# Patient Record
Sex: FEMALE | Race: WHITE | Employment: OTHER | URBAN - METROPOLITAN AREA
[De-identification: names, ages, dates, MRNs, and addresses within clinical notes are randomized per-mention and may not be internally consistent; named-entity substitution may affect disease eponyms.]

---

## 2017-12-06 DIAGNOSIS — R25.1 TREMOR: ICD-10-CM

## 2017-12-06 RX ORDER — PROPRANOLOL HYDROCHLORIDE 80 MG/1
80 CAPSULE, EXTENDED RELEASE ORAL DAILY
Qty: 90 CAPSULE | Refills: 3 | Status: SHIPPED | OUTPATIENT
Start: 2017-12-06 | End: 2018-01-02

## 2018-01-02 ENCOUNTER — OFFICE VISIT (OUTPATIENT)
Dept: NEUROLOGY | Facility: CLINIC | Age: 78
End: 2018-01-02
Payer: MEDICARE

## 2018-01-02 VITALS
WEIGHT: 176.6 LBS | TEMPERATURE: 97.4 F | BODY MASS INDEX: 30.31 KG/M2 | DIASTOLIC BLOOD PRESSURE: 79 MMHG | HEART RATE: 66 BPM | OXYGEN SATURATION: 97 % | SYSTOLIC BLOOD PRESSURE: 134 MMHG

## 2018-01-02 DIAGNOSIS — R25.1 TREMOR: ICD-10-CM

## 2018-01-02 PROCEDURE — 99212 OFFICE O/P EST SF 10 MIN: CPT | Performed by: PSYCHIATRY & NEUROLOGY

## 2018-01-02 RX ORDER — PROPRANOLOL HYDROCHLORIDE 80 MG/1
80 CAPSULE, EXTENDED RELEASE ORAL DAILY
Qty: 90 CAPSULE | Refills: 3 | Status: SHIPPED | OUTPATIENT
Start: 2018-01-02 | End: 2018-12-05

## 2018-01-02 NOTE — NURSING NOTE
"Hoda Rebolledo's goals for this visit include: Tremors- doing ok   She requests these members of her care team be copied on today's visit information: no    PCP: Binu Sultana    Referring Provider:  Binu Brady MD  [address] [place] [address]    Chief Complaint   Patient presents with     Tremors       Initial /79 (BP Location: Right arm, Patient Position: Chair, Cuff Size: Adult Regular)  Pulse 66  Temp 97.4  F (36.3  C) (Oral)  Wt 80.1 kg (176 lb 9.6 oz)  SpO2 97%  BMI 30.31 kg/m2 Estimated body mass index is 30.31 kg/(m^2) as calculated from the following:    Height as of 7/23/13: 1.626 m (5' 4\").    Weight as of this encounter: 80.1 kg (176 lb 9.6 oz).  Medication Reconciliation: complete    Do you need any medication refills at today's visit? no    "

## 2018-01-02 NOTE — MR AVS SNAPSHOT
After Visit Summary   1/2/2018    Hoda Rebolledo    MRN: [id number]           Patient Information     Date Of Birth          1940        Visit Information        Provider Department      1/2/2018 10:30 AM Binu Brady MD [facility]        Today's Diagnoses     Tremor           Follow-ups after your visit        Follow-up notes from your care team     Return in about 1 year (around 1/2/2019).      Your next 10 appointments already scheduled     Dec 05, 2018 10:30 AM CST   Return Visit with Binu Brady MD   [facility] ([facility]    [address] [place] [address] [phone]              Who to contact     If you have questions or need follow up information about today's clinic visit or your schedule please contact [facility] directly at [phone].  Normal or non-critical lab and imaging results will be communicated to you by [employer] Microsystemshart, letter or phone within 4 business days after the clinic has received the results. If you do not hear from us within 7 days, please contact the clinic through [employer] Microsystem[employer]art or phone. If you have a critical or abnormal lab result, we will notify you by phone as soon as possible.  Submit refill requests through Peeky or call your pharmacy and they will forward the refill request to us. Please allow 3 business days for your refill to be completed.          Additional Information About Your Visit        MyChart Information     Peeky gives you secure access to your electronic health record. If you see a primary care provider, you can also send messages to your care team and make appointments. If you have questions, please call your primary care clinic.  If you do not have a primary care provider, please call [phone] and they will assist you.      Peeky is an electronic gateway that provides easy, online access to your medical records. With Peeky, you can  request a clinic appointment, read your test results, renew a prescription or communicate with your care team.     To access your existing account, please contact your [facility] or call [phone] for assistance.        Care EveryWhere ID     This is your Care EveryWhere ID. This could be used by other organizations to access your [place] medical records  [id number]        Your Vitals Were     Pulse Temperature Pulse Oximetry BMI (Body Mass Index)          66 97.4  F (36.3  C) (Oral) 97% 30.31 kg/m2         Blood Pressure from Last 3 Encounters:   01/02/18 134/79   11/28/16 136/80   08/15/16 134/76    Weight from Last 3 Encounters:   01/02/18 80.1 kg (176 lb 9.6 oz)   03/31/14 76.4 kg (168 lb 6.4 oz)   07/23/13 80.7 kg (178 lb)              Today, you had the following     No orders found for display         Where to get your medicines      These medications were sent to [id number] - [place], [place] - [address] [address]     Phone:  [phone]     propranolol 80 MG 24 hr capsule          Primary Care Provider Office Phone # Fax #    Binu Sultana [phone]       [facility] [address] [place] [address]        Equal Access to Services     SHANKAR STEWART AH: Hadii nia ku hadasho Soomaali, waaxda luqadaha, qaybta kaalmada tommie, ivan shoemaker. So [facility] [phone].    ATENCIÓN: Si habla español, tiene a jack disposición servicios gratuitos de asistencia lingüística. Licha al [phone].    We comply with applicable federal civil rights laws and [place] laws. We do not discriminate on the basis of race, color, national origin, age, disability, sex, sexual orientation, or gender identity.            Thank you!     Thank you for choosing [facility]  for your care. Our goal is always to provide you with excellent care. Hearing back  from our patients is one way we can continue to improve our services. Please take a few minutes to complete the written survey that you may receive in the mail after your visit with us. Thank you!             Your Updated Medication List - Protect others around you: Learn how to safely use, store and throw away your medicines at www.disposemymeds.org.          This list is accurate as of: 1/2/18 10:44 AM.  Always use your most recent med list.                   Brand Name Dispense Instructions for use Diagnosis    METFORMIN HCL      1 tablet 2 times daily.        propranolol 80 MG 24 hr capsule    INDERAL LA    90 capsule    Take 1 capsule (80 mg) by mouth daily    Tremor       simvastatin 20 MG tablet    ZOCOR     Take 1 tablet by mouth At Bedtime        vitamin D 2000 UNITS Caps      Take 1 tablet by mouth daily.

## 2018-01-02 NOTE — LETTER
2018         RE: Hoda Phipps  [address]    [place] [address]        Dear Colleague,    Thank you for referring your patient, Hoda Phipps, to the [facility]. Please see a copy of my visit note below.    HISTORY OF PRESENT ILLNESS:  Hoda Phipps is seen in followup with essential tremor.  I last saw her in 2016.  She is here with her .  There has been no major change in her neurologic status.  She is on propranolol long-acting 80 mg per day.  This works as well as anything.  She had been on primidone, but had side effects.  She notices the tremor most prominently when she is trying to drink from a small cup.  Otherwise, she adapts her activities to the tremor.  It is not disabling and not getting worse.      PHYSICAL EXAMINATION:  Her blood pressure is 134/79.  Reproduction of a spiral has an obvious tremulous quality.  There is nothing to add otherwise.      ASSESSMENT:  Essential tremor.      DISCUSSION:  This patient is seen in followup with essential tremor.  She is doing well on her current regimen of propranolol long-acting 80 mg.  I gave her a 1-year refill.  I will see her in followup at that time or sooner if there are issues.         ILSA KENT MD             D: 2018 10:44   T: 2018 10:55   MT: [id number]      Name:     HODA PHIPPS   MRN:      [id number]        Account:      [id number]   :      1940           Visit Date:   2018      Document: [id number]       Again, thank you for allowing me to participate in the care of your patient.        Sincerely,        Ilsa Kent MD

## 2018-01-02 NOTE — PROGRESS NOTES
HISTORY OF PRESENT ILLNESS:  Hoda Phipps is seen in followup with essential tremor.  I last saw her in 2016.  She is here with her .  There has been no major change in her neurologic status.  She is on propranolol long-acting 80 mg per day.  This works as well as anything.  She had been on primidone, but had side effects.  She notices the tremor most prominently when she is trying to drink from a small cup.  Otherwise, she adapts her activities to the tremor.  It is not disabling and not getting worse.      PHYSICAL EXAMINATION:  Her blood pressure is 134/79.  Reproduction of a spiral has an obvious tremulous quality.  There is nothing to add otherwise.      ASSESSMENT:  Essential tremor.      DISCUSSION:  This patient is seen in followup with essential tremor.  She is doing well on her current regimen of propranolol long-acting 80 mg.  I gave her a 1-year refill.  I will see her in followup at that time or sooner if there are issues.         ILSA KENT MD             D: 2018 10:44   T: 2018 10:55   MT: [id number]      Name:     HODA PHIPPS   MRN:      [id number]        Account:      [id number]   :      1940           Visit Date:   2018      Document: [id number]

## 2018-12-05 ENCOUNTER — OFFICE VISIT (OUTPATIENT)
Dept: NEUROLOGY | Facility: CLINIC | Age: 78
End: 2018-12-05
Payer: MEDICARE

## 2018-12-05 VITALS
DIASTOLIC BLOOD PRESSURE: 82 MMHG | OXYGEN SATURATION: 98 % | WEIGHT: 171 LBS | BODY MASS INDEX: 29.35 KG/M2 | HEART RATE: 68 BPM | SYSTOLIC BLOOD PRESSURE: 144 MMHG

## 2018-12-05 DIAGNOSIS — G25.0 ESSENTIAL TREMOR: Primary | ICD-10-CM

## 2018-12-05 DIAGNOSIS — R25.1 TREMOR: ICD-10-CM

## 2018-12-05 PROCEDURE — 99213 OFFICE O/P EST LOW 20 MIN: CPT | Performed by: PSYCHIATRY & NEUROLOGY

## 2018-12-05 RX ORDER — PROPRANOLOL HYDROCHLORIDE 80 MG/1
80 CAPSULE, EXTENDED RELEASE ORAL DAILY
Qty: 90 CAPSULE | Refills: 3 | Status: SHIPPED | OUTPATIENT
Start: 2018-12-05 | End: 2019-12-04

## 2018-12-05 RX ORDER — LORAZEPAM 0.5 MG/1
0.5 TABLET ORAL EVERY 8 HOURS PRN
Qty: 10 TABLET | Refills: 0 | Status: SHIPPED | OUTPATIENT
Start: 2018-12-05 | End: 2019-07-01

## 2018-12-05 NOTE — LETTER
12/5/2018         RE: Hoda Phipps  [address]  [address]  [place] [address]        Dear Colleague,    Thank you for referring your patient, Hoda Phipps, to the [facility]. Please see a copy of my visit note below.    Visit Date:   12/05/2018      NEUROLOGY CONSULTATION       PATIENT OF:  Ilsa Sultana MD, [facility]       INTERVAL HISTORY:  This patient is seen in followup with essential tremor.  She is here with her .  I last saw her almost a year ago.  With regard to the tremor, things are going fairly well.  She is on propranolol long-acting 80 mg daily.  She did have worsening tremor this summer.  She was playing pickleball and fell and broke her upper right arm.  This made her extremely anxious and depressed so the tremor was worse.  Then, she had to have gallbladder surgery while still in a cast.  This was an additional stress to her.  She now is back to her baseline.  Her only real complaint is that when she eats in public she has worsening tremor.      PHYSICAL EXAMINATION:   GENERAL:  The patient is cooperative and in no distress.   VITAL SIGNS:  Her blood pressure is 144/82.     NEUROLOGIC:  Her reproduction of a spiral shows obvious tremor but it is legible.   She has tremor with her hands held out in front of her.      ASSESSMENT:  Essential tremor.      DISCUSSION:  This patient is seen in followup with essential tremor.  I have given her a refill of propranolol 80 mg long-acting daily.  In addition, I have given her a prescription for lorazepam 0.5 mg to use on an as needed basis when she goes out in public.  Hopefully, this will help moderate the tremor, so it is not such a bother to her.  I did review the side effects including sedation.  She will try it.  Otherwise, I will see her in followup in 1 year.         ILSA KENT MD             D: 12/05/2018   T: 12/05/2018   MT: JEFFREY      Name:     HODA PHIPPS   MRN:      [id number]         Account:      [id number]   :      1940           Visit Date:   2018      Document: [id number]       Again, thank you for allowing me to participate in the care of your patient.        Sincerely,        Binu Brady MD

## 2018-12-05 NOTE — NURSING NOTE
Hoda Rebolledo's goals for this visit include:   Chief Complaint   Patient presents with     RECHECK     tremors getting worse      She requests these members of her care team be copied on today's visit information: PCP    PCP: Binu Sultana    Referring Provider:  Binu Brady MD  [address] [place] [address]    /82 (BP Location: Left arm, Patient Position: Sitting, Cuff Size: Adult Regular)  Pulse 68  Wt 77.6 kg (171 lb)  SpO2 98%  BMI 29.35 kg/m2    Do you need any medication refills at today's visit? N

## 2018-12-05 NOTE — MR AVS SNAPSHOT
After Visit Summary   12/5/2018    Hoda Rebolledo    MRN: [id number]           Patient Information     Date Of Birth          1940        Visit Information        Provider Department      12/5/2018 10:30 AM Binu Brady MD [facility]        Today's Diagnoses     Essential tremor    -  1    Tremor           Follow-ups after your visit        Follow-up notes from your care team     Return in about 1 year (around 12/5/2019).      Your next 10 appointments already scheduled     Dec 04, 2019 10:30 AM CST   Return Visit with Binu Brady MD   [facility] ([facility]    [address] [place] [address] [phone]              Who to contact     If you have questions or need follow up information about today's clinic visit or your schedule please contact [facility] directly at [phone].  Normal or non-critical lab and imaging results will be communicated to you by [employer]Conceptshart, letter or phone within 4 business days after the clinic has received the results. If you do not hear from us within 7 days, please contact the clinic through [employer]art or phone. If you have a critical or abnormal lab result, we will notify you by phone as soon as possible.  Submit refill requests through Narr8 or call your pharmacy and they will forward the refill request to us. Please allow 3 business days for your refill to be completed.          Additional Information About Your Visit        MyChart Information     [employer] gives you secure access to your electronic health record. If you see a primary care provider, you can also send messages to your care team and make appointments. If you have questions, please call your primary care clinic.  If you do not have a primary care provider, please call [phone] and they will assist you.      Narr8 is an electronic gateway that provides easy, online access to your medical  records. With "[employer] you can request a clinic appointment, read your test results, renew a prescription or communicate with your care team.     To access your existing account, please contact your [facility] or call [phone] for assistance.        Care EveryWhere ID     This is your Care EveryWhere ID. This could be used by other organizations to access your [place] medical records  [id number]        Your Vitals Were     Pulse Pulse Oximetry BMI (Body Mass Index)             68 98% 29.35 kg/m2          Blood Pressure from Last 3 Encounters:   12/05/18 144/82   01/02/18 134/79   11/28/16 136/80    Weight from Last 3 Encounters:   12/05/18 77.6 kg (171 lb)   01/02/18 80.1 kg (176 lb 9.6 oz)   03/31/14 76.4 kg (168 lb 6.4 oz)              Today, you had the following     No orders found for display         Today's Medication Changes          These changes are accurate as of 12/5/18 10:51 AM.  If you have any questions, ask your nurse or doctor.               Start taking these medicines.        Dose/Directions    LORazepam 0.5 MG tablet   Commonly known as:  ATIVAN   Used for:  Essential tremor   Started by:  Binu Brady MD        Dose:  0.5 mg   Take 1 tablet (0.5 mg) by mouth every 8 hours as needed for anxiety   Quantity:  10 tablet   Refills:  0            Where to get your medicines      These medications were sent to [id number] [address] [facility] [address]     Phone:  [phone]     propranolol ER 80 MG 24 hr capsule         Some of these will need a paper prescription and others can be bought over the counter.  Ask your nurse if you have questions.     Bring a paper prescription for each of these medications     LORazepam 0.5 MG tablet                Primary Care Provider Office Phone # Fax #    Binu Sultana [phone]       [facility] [address]  [place]  [place] [address]        Equal Access to Services     MORELIA STEWART : Hadii aad ku hadeffiekatalina Roslynali, wamichelleda luqadaha, qaybta kadakotaivan chadwickmadhujoslyn shoemaker. So [facility] [phone].    ATENCIÓN: Si habla español, tiene a jack disposición servicios gratuitos de asistencia lingüística. Elizabethame al [phone].    We comply with applicable federal civil rights laws and [place] laws. We do not discriminate on the basis of race, color, national origin, age, disability, sex, sexual orientation, or gender identity.            Thank you!     Thank you for choosing [facility]  for your care. Our goal is always to provide you with excellent care. Hearing back from our patients is one way we can continue to improve our services. Please take a few minutes to complete the written survey that you may receive in the mail after your visit with us. Thank you!             Your Updated Medication List - Protect others around you: Learn how to safely use, store and throw away your medicines at www.disposemymeds.org.          This list is accurate as of 12/5/18 10:51 AM.  Always use your most recent med list.                   Brand Name Dispense Instructions for use Diagnosis    LORazepam 0.5 MG tablet    ATIVAN    10 tablet    Take 1 tablet (0.5 mg) by mouth every 8 hours as needed for anxiety    Essential tremor       METFORMIN HCL      1 tablet 2 times daily.        propranolol ER 80 MG 24 hr capsule    INDERAL LA    90 capsule    Take 1 capsule (80 mg) by mouth daily    Tremor       simvastatin 20 MG tablet    ZOCOR     Take 1 tablet by mouth At Bedtime        vitamin D 2000 units Caps      Take 1 tablet by mouth daily.

## 2018-12-06 NOTE — PROGRESS NOTES
Visit Date:   2018      NEUROLOGY CONSULTATION       PATIENT OF:  Ilsa Sultana MD, [facility] HISTORY:  This patient is seen in followup with essential tremor.  She is here with her .  I last saw her almost a year ago.  With regard to the tremor, things are going fairly well.  She is on propranolol long-acting 80 mg daily.  She did have worsening tremor this summer.  She was playing pickleball and fell and broke her upper right arm.  This made her extremely anxious and depressed so the tremor was worse.  Then, she had to have gallbladder surgery while still in a cast.  This was an additional stress to her.  She now is back to her baseline.  Her only real complaint is that when she eats in public she has worsening tremor.      PHYSICAL EXAMINATION:   GENERAL:  The patient is cooperative and in no distress.   VITAL SIGNS:  Her blood pressure is 144/82.     NEUROLOGIC:  Her reproduction of a spiral shows obvious tremor but it is legible.   She has tremor with her hands held out in front of her.      ASSESSMENT:  Essential tremor.      DISCUSSION:  This patient is seen in followup with essential tremor.  I have given her a refill of propranolol 80 mg long-acting daily.  In addition, I have given her a prescription for lorazepam 0.5 mg to use on an as needed basis when she goes out in public.  Hopefully, this will help moderate the tremor, so it is not such a bother to her.  I did review the side effects including sedation.  She will try it.  Otherwise, I will see her in followup in 1 year.         ILSA KENT MD             D: 2018   T: 2018   MT: JEFFREY      Name:     ABUNDIO PHIPPS   MRN:      [id number]        Account:      [id number]   :      1940           Visit Date:   2018      Document: [id number]

## 2019-07-01 ENCOUNTER — TELEPHONE (OUTPATIENT)
Dept: NEUROLOGY | Facility: CLINIC | Age: 79
End: 2019-07-01

## 2019-07-01 DIAGNOSIS — G25.0 ESSENTIAL TREMOR: ICD-10-CM

## 2019-07-01 NOTE — TELEPHONE ENCOUNTER
Rx Authorization:    Date last refill ordered: 12/5/18    Quantity ordered: 10    # refills: 0    Date of last clinic visit: 12/5/18    Date of next clinic visit: 12/4/19

## 2019-07-02 RX ORDER — LORAZEPAM 0.5 MG/1
TABLET ORAL
Qty: 10 TABLET | Refills: 0 | Status: SHIPPED | OUTPATIENT
Start: 2019-07-02 | End: 2019-12-04

## 2019-07-02 NOTE — TELEPHONE ENCOUNTER
Rx signed by Dr. Brady and faxed to pt's Pharmacy - [facility] in [place]. Susanne Figueroa RN

## 2019-10-05 ENCOUNTER — HEALTH MAINTENANCE LETTER (OUTPATIENT)
Age: 79
End: 2019-10-05

## 2019-12-04 ENCOUNTER — OFFICE VISIT (OUTPATIENT)
Dept: NEUROLOGY | Facility: CLINIC | Age: 79
End: 2019-12-04
Payer: MEDICARE

## 2019-12-04 VITALS
DIASTOLIC BLOOD PRESSURE: 76 MMHG | RESPIRATION RATE: 16 BRPM | BODY MASS INDEX: 30.97 KG/M2 | SYSTOLIC BLOOD PRESSURE: 117 MMHG | WEIGHT: 174.8 LBS | HEIGHT: 63 IN | HEART RATE: 69 BPM | OXYGEN SATURATION: 95 %

## 2019-12-04 DIAGNOSIS — F41.9 ANXIETY: Primary | ICD-10-CM

## 2019-12-04 DIAGNOSIS — R25.1 TREMOR: ICD-10-CM

## 2019-12-04 PROCEDURE — 99213 OFFICE O/P EST LOW 20 MIN: CPT | Performed by: PSYCHIATRY & NEUROLOGY

## 2019-12-04 RX ORDER — PROPRANOLOL HYDROCHLORIDE 80 MG/1
80 CAPSULE, EXTENDED RELEASE ORAL DAILY
Qty: 90 CAPSULE | Refills: 3 | Status: SHIPPED | OUTPATIENT
Start: 2019-12-04 | End: 2020-03-04

## 2019-12-04 RX ORDER — ESCITALOPRAM OXALATE 10 MG/1
10 TABLET ORAL SEE ADMIN INSTRUCTIONS
Qty: 30 TABLET | Refills: 3 | Status: SHIPPED | OUTPATIENT
Start: 2019-12-04 | End: 2020-03-04

## 2019-12-04 ASSESSMENT — MIFFLIN-ST. JEOR: SCORE: 1242.02

## 2019-12-04 ASSESSMENT — PAIN SCALES - GENERAL: PAINLEVEL: NO PAIN (0)

## 2019-12-04 NOTE — PROGRESS NOTES
"Visit Date:   2019      HISTORY OF PRESENT ILLNESS:  This patient is seen in followup with tremor.  I last saw her a year ago.  She is here today with her .  She said that overall she has had a good year.  She has not had any falls or broken bones and has not had to have surgery.  However, the tremor is bad.  She is \"in a funk\" because of her tremor.  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  Even brushing her teeth can be a challenge.  She has to use 2 hands for a lot of the activities.  She easily gets frustrated and then \"anxiety takes over.\"  She has been on lorazepam on an as-needed basis, but she does not find it beneficial, and it only makes her tired.  She is on propranolol extended release 80 mg daily.      PHYSICAL EXAMINATION:   GENERAL:  The patient is cooperative and in no distress.   VITAL SIGNS:  Her blood pressure is 117/76.  She has obvious tremor with action, and her reproduction of a spiral is quite tremulous but legible.      ASSESSMENT:   1.  Essential tremor.   2.  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      DISCUSSION:  The patient is seen with the above problem list.  At this point, I am going to try her on escitalopram 5 mg, building up to 10 mg to see if that might help with the anxiety and therefore improve the tremor.  She says she gets into a vicious cycle where she is embarrassed about the tremor.  The tremor worsens, and then she gets more embarrassed.  I will see her in followup in 3 months for reexamination.  I encouraged her to call if there are questions or problems.         ILSA KENT MD             D: 2019   T: 2019   MT: GINGER      Name:     ABUNDIO PHIPPS   MRN:      [id number]        Account:      [id number]   :      1940           Visit Date:   2019      Document: [id number]    "

## 2019-12-04 NOTE — LETTER
"    12/4/2019         RE: Hoda Phipps  [address]  [address]  [place] [address]        Dear Colleague,    Thank you for referring your patient, Hoda Phipps, to the [facility]. Please see a copy of my visit note below.    Visit Date:   12/04/2019      HISTORY OF PRESENT ILLNESS:  This patient is seen in followup with tremor.  I last saw her a year ago.  She is here today with her .  She said that overall she has had a good year.  She has not had any falls or broken bones and has not had to have surgery.  However, the tremor is bad.  She is \"in a funk\" because of her tremor.  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  Even brushing her teeth can be a challenge.  She has to use 2 hands for a lot of the activities.  She easily gets frustrated and then \"anxiety takes over.\"  She has been on lorazepam on an as-needed basis, but she does not find it beneficial, and it only makes her tired.  She is on propranolol extended release 80 mg daily.      PHYSICAL EXAMINATION:   GENERAL:  The patient is cooperative and in no distress.   VITAL SIGNS:  Her blood pressure is 117/76.  She has obvious tremor with action, and her reproduction of a spiral is quite tremulous but legible.      ASSESSMENT:   1.  Essential tremor.   2.  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      DISCUSSION:  The patient is seen with the above problem list.  At this point, I am going to try her on escitalopram 5 mg, building up to 10 mg to see if that might help with the anxiety and therefore improve the tremor.  She says she gets into a vicious cycle where she is embarrassed about the tremor.  The tremor worsens, and then she gets more embarrassed.  I will see her in followup in 3 months for reexamination.  I encouraged her to call if there are questions or problems.         ILSA KENT MD             D: 12/04/2019   T: 12/04/2019   MT:       Name:     HODA PHIPPS "   MRN:      [id number]        Account:      [id number]   :      1940           Visit Date:   2019      Document: [id number]       Again, thank you for allowing me to participate in the care of your patient.        Sincerely,        Binu Brady MD

## 2019-12-04 NOTE — NURSING NOTE
"Hoda Rebolledo's goals for this visit include: Return  She requests these members of her care team be copied on today's visit information: PCP    PCP: Jose Grande    Referring Provider:  No referring provider defined for this encounter.    /76   Pulse 69   Resp 16   Ht 1.6 m (5' 3\")   Wt 79.3 kg (174 lb 12.8 oz)   SpO2 95%   BMI 30.96 kg/m      Do you need any medication refills at today's visit? N    "

## 2020-02-10 ENCOUNTER — HEALTH MAINTENANCE LETTER (OUTPATIENT)
Age: 80
End: 2020-02-10

## 2020-03-04 ENCOUNTER — OFFICE VISIT (OUTPATIENT)
Dept: NEUROLOGY | Facility: CLINIC | Age: 80
End: 2020-03-04
Payer: MEDICARE

## 2020-03-04 VITALS
WEIGHT: 174 LBS | OXYGEN SATURATION: 97 % | HEIGHT: 63 IN | SYSTOLIC BLOOD PRESSURE: 135 MMHG | HEART RATE: 72 BPM | BODY MASS INDEX: 30.83 KG/M2 | DIASTOLIC BLOOD PRESSURE: 89 MMHG | RESPIRATION RATE: 16 BRPM

## 2020-03-04 DIAGNOSIS — R25.1 TREMOR: ICD-10-CM

## 2020-03-04 PROCEDURE — 99213 OFFICE O/P EST LOW 20 MIN: CPT | Performed by: PSYCHIATRY & NEUROLOGY

## 2020-03-04 RX ORDER — PROPRANOLOL HYDROCHLORIDE 80 MG/1
160 CAPSULE, EXTENDED RELEASE ORAL DAILY
Qty: 180 CAPSULE | Refills: 3 | Status: SHIPPED | OUTPATIENT
Start: 2020-03-04 | End: 2021-05-03

## 2020-03-04 ASSESSMENT — MIFFLIN-ST. JEOR: SCORE: 1238.39

## 2020-03-04 NOTE — PROGRESS NOTES
Visit Date:   2020      HISTORY OF PRESENT ILLNESS:  This patient is seen in followup with essential tremor.  She is here with her .  I last saw the patient in December.  At that time anxiety was playing a large role in her tremor.  For that reason, I tried her on escitalopram.  That only made the tremor worse so she stopped it.  She was down in [place] for 3 weeks on vacation and hardly remembers shaking at all.  She thinks when she is more relaxed the tremor is a lot better.  She is quite frustrated with it, though.  She has been on various treatments for the tremor including lorazepam, primidone and gabapentin.  These have not been very helpful.  She has never been on topiramate or clonazepam.  She does not think she could drink 8 glasses of water a day, which would be recommended with use of topiramate.  She is on propranolol 80 mg long-acting.  She is wondering about doubling the dose.      PHYSICAL EXAMINATION:   GENERAL:  The patient is cooperative and in no distress.   VITAL SIGNS:  Her blood pressure is 135/89.  Heart rate is 72.     Her reproduction of a spiral shows obvious tremor.  This is her baseline.      ASSESSMENT:  Essential tremor.      DISCUSSION:  This patient is seen in followup with ongoing issues of essential tremor.  At this point, her request is to try and double the dose of propranolol, so I am going to give her a new prescription to take 160 mg long-acting daily.  She is aware of the side effects including slow heart rate and low blood pressure.  If she has problems with this, she knows to contact me.  Otherwise, I will see her in followup in 3 months.         ILSA KENT MD             D: 2020   T: 2020   MT:       Name:     ABUNDIO PHIPPS   MRN:      [id number]        Account:      [id number]   :      1940           Visit Date:   2020      Document: [id number]

## 2020-03-04 NOTE — NURSING NOTE
"Hoda Rebolledo's goals for this visit include: return  She requests these members of her care team be copied on today's visit information:     PCP: Jose Grande    Referring Provider:  No referring provider defined for this encounter.    /89   Pulse 72   Resp 16   Ht 1.6 m (5' 3\")   Wt 78.9 kg (174 lb)   SpO2 97%   BMI 30.82 kg/m      Do you need any medication refills at today's visit? Yes  "

## 2020-03-04 NOTE — LETTER
3/4/2020         RE: Hoda Rebolledo  [address]  [address]  [place] [place] [address]        Dear Colleague,    Thank you for referring your patient, Hoda Rebolledo, to the [facility]. Please see a copy of my visit note below.    Visit Date:   03/04/2020      HISTORY OF PRESENT ILLNESS:  This patient is seen in followup with essential tremor.  She is here with her .  I last saw the patient in December.  At that time anxiety was playing a large role in her tremor.  For that reason, I tried her on escitalopram.  That only made the tremor worse so she stopped it.  She was down in [place] for 3 weeks on vacation and hardly remembers shaking at all.  She thinks when she is more relaxed the tremor is a lot better.  She is quite frustrated with it, though.  She has been on various treatments for the tremor including lorazepam, primidone and gabapentin.  These have not been very helpful.  She has never been on topiramate or clonazepam.  She does not think she could drink 8 glasses of water a day, which would be recommended with use of topiramate.  She is on propranolol 80 mg long-acting.  She is wondering about doubling the dose.      PHYSICAL EXAMINATION:   GENERAL:  The patient is cooperative and in no distress.   VITAL SIGNS:  Her blood pressure is 135/89.  Heart rate is 72.     Her reproduction of a spiral shows obvious tremor.  This is her baseline.      ASSESSMENT:  Essential tremor.      DISCUSSION:  This patient is seen in followup with ongoing issues of essential tremor.  At this point, her request is to try and double the dose of propranolol, so I am going to give her a new prescription to take 160 mg long-acting daily.  She is aware of the side effects including slow heart rate and low blood pressure.  If she has problems with this, she knows to contact me.  Otherwise, I will see her in followup in 3 months.         ILSA KENT MD             D: 03/04/2020   T: 03/04/2020    MT:       Name:     ABUNDIO PHIPPS   MRN:      [id number]        Account:      [id number]   :      1940           Visit Date:   2020      Document: [id number]       Again, thank you for allowing me to participate in the care of your patient.        Sincerely,        Binu Brady MD

## 2020-06-04 ENCOUNTER — VIRTUAL VISIT (OUTPATIENT)
Dept: NEUROLOGY | Facility: CLINIC | Age: 80
End: 2020-06-04
Payer: MEDICARE

## 2020-06-04 DIAGNOSIS — G25.0 ESSENTIAL TREMOR: Primary | ICD-10-CM

## 2020-06-04 PROCEDURE — 99441 ZZC PHYSICIAN TELEPHONE EVALUATION 5-10 MIN: CPT | Performed by: PSYCHIATRY & NEUROLOGY

## 2020-06-04 NOTE — PROGRESS NOTES
"Visit Date:   2020      This is a telephone followup visit because of the virus epidemic.  Call initiated time 10:22, call terminated time 10:32.      HISTORY OF PRESENT ILLNESS:  I saw the patient 3 months ago.  She has an essential tremor.  At the time of her last visit, I had increased her dose of propranolol from 80 mg long-acting to 160 mg daily.  She did not care for the dose increase.  She thought she was \"shaking more.\"  She thinks it is possible that that was due to the stress related to the virus epidemic.  In any case, she went back to the 80 mg dose.  She feels like she is back to baseline.  She does have tremor and it is worse if she is anxious.  She has been on numerous treatments for tremor including lorazepam, primidone and gabapentin.  These have not been helpful.  I also had tried her on escitalopram, but that made the tremor worse.      She and her  live in a [facility].  They are pretty much in a shutdown mode.  There are no activities and get out very little.  She is cooking more.  Her  and she are both involved in projects organizing their photo albums.      PHYSICAL EXAMINATION:  There is nothing to add on examination, as this is a telephone visit.      ASSESSMENT:  Essential tremor.      DISCUSSION:  This patient had a followup visit for essential tremor.  She is back on propranolol 80 mg daily.  She tolerates it and it seems to help somewhat.  She has no interest in making any modifications or changes to her regimen.  She is going to follow up with me on an as-needed basis.  I encouraged her to call if there were questions or problems.         ILSA KENT MD             D: 2020   T: 2020   MT: WHITNEY      Name:     ABUNDIO PHIPPS   MRN:      [id number]        Account:      [id number]   :      1940           Visit Date:   2020      Document: [id number]    "

## 2020-06-04 NOTE — PROGRESS NOTES
"Hoda Rebolledo is a 79 year old female who is being evaluated via a billable telephone visit.      The patient has been notified of following:     \"This telephone visit will be conducted via a call between you and your physician/provider. We have found that certain health care needs can be provided without the need for a physical exam.  This service lets us provide the care you need with a short phone conversation.  If a prescription is necessary we can send it directly to your pharmacy.  If lab work is needed we can place an order for that and you can then stop by our lab to have the test done at a later time.    Telephone visits are billed at different rates depending on your insurance coverage. During this emergency period, for some insurers they may be billed the same as an in-person visit.  Please reach out to your insurance provider with any questions.    If during the course of the call the physician/provider feels a telephone visit is not appropriate, you will not be charged for this service.\"    Patient has given verbal consent for Telephone visit?  Yes    What phone number would you like to be contacted at? [phone]    How would you like to obtain your AVS? Chelsy Malik LPN on 6/4/2020 at 9:46 AM      "

## 2020-11-14 ENCOUNTER — HEALTH MAINTENANCE LETTER (OUTPATIENT)
Age: 80
End: 2020-11-14

## 2021-01-30 ENCOUNTER — IMMUNIZATION (OUTPATIENT)
Dept: NURSING | Facility: CLINIC | Age: 81
End: 2021-01-30
Payer: MEDICARE

## 2021-01-30 PROCEDURE — 0001A PR COVID VAC PFIZER DIL RECON 30 MCG/0.3 ML IM: CPT

## 2021-01-30 PROCEDURE — 91300 PR COVID VAC PFIZER DIL RECON 30 MCG/0.3 ML IM: CPT

## 2021-02-20 ENCOUNTER — IMMUNIZATION (OUTPATIENT)
Dept: NURSING | Facility: CLINIC | Age: 81
End: 2021-02-20
Attending: FAMILY MEDICINE
Payer: MEDICARE

## 2021-02-20 PROCEDURE — 91300 PR COVID VAC PFIZER DIL RECON 30 MCG/0.3 ML IM: CPT

## 2021-02-20 PROCEDURE — 0002A PR COVID VAC PFIZER DIL RECON 30 MCG/0.3 ML IM: CPT

## 2021-04-03 ENCOUNTER — HEALTH MAINTENANCE LETTER (OUTPATIENT)
Age: 81
End: 2021-04-03

## 2021-05-03 DIAGNOSIS — R25.1 TREMOR: ICD-10-CM

## 2021-05-03 RX ORDER — PROPRANOLOL HYDROCHLORIDE 80 MG/1
80 CAPSULE, EXTENDED RELEASE ORAL DAILY
Qty: 60 CAPSULE | Refills: 1 | Status: SHIPPED | OUTPATIENT
Start: 2021-05-03 | End: 2021-06-02 | Stop reason: DRUGHIGH

## 2021-05-03 RX ORDER — PROPRANOLOL HYDROCHLORIDE 80 MG/1
80 CAPSULE, EXTENDED RELEASE ORAL DAILY
Qty: 30 CAPSULE | Refills: 1 | Status: SHIPPED | OUTPATIENT
Start: 2021-05-03 | End: 2021-05-03

## 2021-06-02 ENCOUNTER — OFFICE VISIT (OUTPATIENT)
Dept: NEUROLOGY | Facility: CLINIC | Age: 81
End: 2021-06-02
Payer: MEDICARE

## 2021-06-02 VITALS
BODY MASS INDEX: 30.4 KG/M2 | SYSTOLIC BLOOD PRESSURE: 119 MMHG | WEIGHT: 171.6 LBS | HEART RATE: 73 BPM | DIASTOLIC BLOOD PRESSURE: 68 MMHG

## 2021-06-02 DIAGNOSIS — G25.0 ESSENTIAL TREMOR: Primary | ICD-10-CM

## 2021-06-02 DIAGNOSIS — G25.0 FAMILIAL TREMOR: ICD-10-CM

## 2021-06-02 PROCEDURE — 99214 OFFICE O/P EST MOD 30 MIN: CPT | Performed by: PSYCHIATRY & NEUROLOGY

## 2021-06-02 RX ORDER — PROPRANOLOL HYDROCHLORIDE 120 MG/1
120 CAPSULE, EXTENDED RELEASE ORAL DAILY
Qty: 30 CAPSULE | Refills: 1 | Status: SHIPPED | OUTPATIENT
Start: 2021-06-02 | End: 2021-06-24

## 2021-06-02 RX ORDER — METFORMIN HCL 500 MG
1000 TABLET, EXTENDED RELEASE 24 HR ORAL 2 TIMES DAILY WITH MEALS
COMMUNITY

## 2021-06-02 ASSESSMENT — PAIN SCALES - GENERAL: PAINLEVEL: NO PAIN (0)

## 2021-06-02 NOTE — LETTER
6/2/2021         RE: Hoda Rebolledo  [address]  [address]  [place] [address]        Dear Colleague,    Thank you for referring your patient, Hoda Rebolledo, to the [facility] [place]. Please see a copy of my visit note below.    Visit Date: 06/02/2021    HISTORY OF PRESENT ILLNESS: Hoda Rebolledo is a 80-year-old female being seen for an essential tremor.  She was originally under the care of Dr. Binu Brady who has since retired.  History is obtained from the patient as well as review of Dr. Brady's notes.    The patient has been troubled by the tremor for many years.  It is particularly problematic when she has to do things such as eating with a fork or drinking.  It also affects her handwriting.  The tremor intensifies when she is anxious.  She denies a rest tremor or difficulty with gait.    There is a strong family history of tremor, including sisters, her mother and maternal grandmother.    I did review Dr. Brady's notes regarding treatment.  Her initial treatment was with propranolol, I believe.  He also mentions she was tried on lorazepam.  She went up to 150 mg 3 times a day of primidone, which really did not help.  Also, she was on gabapentin 600 mg 3 times a day, but this ultimately lost its effect.    She went back to propranolol.  She was taking 80 mg of the long-acting preparation a day, which she found helpful.  She tried a couple times to increase it to 160 mg a day, but it made her feel lightheaded.  She is now back on the 80 mg dose.    Dr. Brady also indicated he tried escitalopram, I believe, to help with her anxiety, which exacerbates her tremor, but the escitalopram made her tremor worse.    PAST MEDICAL HISTORY:  Notable for breast cancer and type 2 diabetes.    CURRENT MEDICATIONS:    1.  Propranolol ER 80 mg a day.  2.  Calcium.  3.  Vitamin D.  4.  Metformin.    5.  Simvastatin.    ALLERGIES:  SHE HAS NO MEDICAL ALLERGIES.    FAMILY  HISTORY:  As noted above.  There is a strong family history of tremor on the maternal side.    She lives with her .  She does not smoke.  She uses very little alcohol and is uncertain if alcohol would have any positive impact on her tremor.    PHYSICAL EXAMINATION:  Reveals she is alert and cooperative.  Heart rate 73.  Blood pressure 119/68.    The patient has a head tremor.  She has a right greater than left postural tremor.  She has a relatively symmetric moderately severe action tremor.  No clear rest tremor is appreciated, although her hands are tremulous when she is writing.  Her handwriting is legible, but is quite tremulous, as is her spiral.    Pupils are equal, round and react well to light.  She has full extraocular motility.  Otherwise, cranial nerves II through XII are intact.  Motor examination reveals normal strength.  Sensory examination is notable for diminished vibratory sense in the toes.  She does have intact position sense and pinprick appreciation.  Finger-to-nose is accurate, albeit tremulous.  Her gait is unremarkable.  Reflexes are 2+ in the upper extremities, 2+ in the knees and absent at the ankles.  Plantar responses are flexor.    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    PLAN:  I reviewed medications that have been tried and ultimately failed, including lorazepam, primidone and gabapentin.  Propranolol has been partially effective, but she had trouble going up to 160 mg due to lightheadedness.    I did discuss Topamax as an alternative or possibly adding Topamax to the propranolol, which has been partially effective.  She was not enthusiastic about this, although she does not have any absolute contraindications to Topamax.    It was decided to try and push her propranolol dose up to 120 mg.  I again note she has had trouble tolerating the 160 mg dose.    She is aware of the potential side effects including those related to low heart rate and low blood  pressure.    She is going to increase her propranolol ER dose to 120 mg a day.  If she has any side effects, she will go back down to the 80 mg dose.    I will be seeing her back in about 3 weeks.    Total visit time today was 32 minutes.  This included a review of her medical record prior to the visit today, history, examination, counseling, and documentation.    Jaswant Blue MD        D: 2021   T: 2021   MT: HARJINDER    Name:     ABUNDIO PHIPPS  MRN:      [id number]        Account:    [id number]   :      1940           Visit Date: 2021     Document: [id number]        Again, thank you for allowing me to participate in the care of your patient.        Sincerely,        Jaswant Blue MD

## 2021-06-02 NOTE — PROGRESS NOTES
Visit Date: 06/02/2021    HISTORY OF PRESENT ILLNESS: Hoda Rebolledo is a 80-year-old female being seen for an essential tremor.  She was originally under the care of Dr. Biun Brady who has since retired.  History is obtained from the patient as well as review of Dr. Brady's notes.    The patient has been troubled by the tremor for many years.  It is particularly problematic when she has to do things such as eating with a fork or drinking.  It also affects her handwriting.  The tremor intensifies when she is anxious.  She denies a rest tremor or difficulty with gait.    There is a strong family history of tremor, including sisters, her mother and maternal grandmother.    I did review Dr. Brady's notes regarding treatment.  Her initial treatment was with propranolol, I believe.  He also mentions she was tried on lorazepam.  She went up to 150 mg 3 times a day of primidone, which really did not help.  Also, she was on gabapentin 600 mg 3 times a day, but this ultimately lost its effect.    She went back to propranolol.  She was taking 80 mg of the long-acting preparation a day, which she found helpful.  She tried a couple times to increase it to 160 mg a day, but it made her feel lightheaded.  She is now back on the 80 mg dose.    Dr. Brady also indicated he tried escitalopram, I believe, to help with her anxiety, which exacerbates her tremor, but the escitalopram made her tremor worse.    PAST MEDICAL HISTORY:  Notable for breast cancer and type 2 diabetes.    CURRENT MEDICATIONS:    1.  Propranolol ER 80 mg a day.  2.  Calcium.  3.  Vitamin D.  4.  Metformin.    5.  Simvastatin.    ALLERGIES:  SHE HAS NO MEDICAL ALLERGIES.    FAMILY HISTORY:  As noted above.  There is a strong family history of tremor on the maternal side.    She lives with her .  She does not smoke.  She uses very little alcohol and is uncertain if alcohol would have any positive impact on her tremor.    PHYSICAL EXAMINATION:  Reveals  she is alert and cooperative.  Heart rate 73.  Blood pressure 119/68.    The patient has a head tremor.  She has a right greater than left postural tremor.  She has a relatively symmetric moderately severe action tremor.  No clear rest tremor is appreciated, although her hands are tremulous when she is writing.  Her handwriting is legible, but is quite tremulous, as is her spiral.    Pupils are equal, round and react well to light.  She has full extraocular motility.  Otherwise, cranial nerves II through XII are intact.  Motor examination reveals normal strength.  Sensory examination is notable for diminished vibratory sense in the toes.  She does have intact position sense and pinprick appreciation.  Finger-to-nose is accurate, albeit tremulous.  Her gait is unremarkable.  Reflexes are 2+ in the upper extremities, 2+ in the knees and absent at the ankles.  Plantar responses are flexor.    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    PLAN:  I reviewed medications that have been tried and ultimately failed, including lorazepam, primidone and gabapentin.  Propranolol has been partially effective, but she had trouble going up to 160 mg due to lightheadedness.    I did discuss Topamax as an alternative or possibly adding Topamax to the propranolol, which has been partially effective.  She was not enthusiastic about this, although she does not have any absolute contraindications to Topamax.    It was decided to try and push her propranolol dose up to 120 mg.  I again note she has had trouble tolerating the 160 mg dose.    She is aware of the potential side effects including those related to low heart rate and low blood pressure.    She is going to increase her propranolol ER dose to 120 mg a day.  If she has any side effects, she will go back down to the 80 mg dose.    I will be seeing her back in about 3 weeks.    Total visit time today was 32 minutes.  This included a review of her medical record prior  to the visit today, history, examination, counseling, and documentation.    Jaswant Blue MD        D: 2021   T: 2021   MT: HARJINDER    Name:     ABUNDIO PHIPPS  MRN:      [id number]        Account:    [id number]   :      1940           Visit Date: 2021     Document: [id number]

## 2021-06-24 ENCOUNTER — OFFICE VISIT (OUTPATIENT)
Dept: NEUROLOGY | Facility: CLINIC | Age: 81
End: 2021-06-24
Payer: MEDICARE

## 2021-06-24 VITALS
SYSTOLIC BLOOD PRESSURE: 130 MMHG | OXYGEN SATURATION: 97 % | BODY MASS INDEX: 30.3 KG/M2 | HEIGHT: 63 IN | WEIGHT: 171 LBS | HEART RATE: 59 BPM | DIASTOLIC BLOOD PRESSURE: 70 MMHG

## 2021-06-24 DIAGNOSIS — G25.0 FAMILIAL TREMOR: ICD-10-CM

## 2021-06-24 DIAGNOSIS — G25.0 ESSENTIAL TREMOR: ICD-10-CM

## 2021-06-24 PROCEDURE — 99212 OFFICE O/P EST SF 10 MIN: CPT | Performed by: PSYCHIATRY & NEUROLOGY

## 2021-06-24 RX ORDER — PROPRANOLOL HYDROCHLORIDE 120 MG/1
120 CAPSULE, EXTENDED RELEASE ORAL DAILY
Qty: 90 CAPSULE | Refills: 2 | Status: SHIPPED | OUTPATIENT
Start: 2021-06-24 | End: 2022-04-06

## 2021-06-24 ASSESSMENT — MIFFLIN-ST. JEOR: SCORE: 1219.78

## 2021-06-24 ASSESSMENT — PAIN SCALES - GENERAL: PAINLEVEL: NO PAIN (0)

## 2021-06-24 NOTE — PROGRESS NOTES
Visit Date: 2021    Hoda Phipps returns to see how she did with an increase in her propranolol dose.  She is a patient with an essential tremor, likely a familial tremor.  She had been on 80 mg of propranolol, which she found helpful, but had not been fully effective for controlling her tremor adequately.  In the past, she went up to 160 mg, but it made her feel lightheaded.  It was decided to try going to the 120 mg dose.    I again note that previous trials of primidone and gabapentin were not effective.    She has tolerated the current dose of propranolol well and she feels it has helped her tremor on the higher dose.  She is able to eat and drink a lot better and she is very pleased about this.    PHYSICAL EXAMINATION:    VITAL SIGNS:  Reveals her heart rate is 59.  Blood pressure 130/70.  NEUROLOGIC:  She has a slight head tremor.  She has only a very mild postural tremor.  She has a moderately severe action tremor of both upper extremities.  There is no rest tremor.  She can get up and ambulate without any difficulty.    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    PLAN:  She is comfortable with how she is doing with the long-acting propranolol 120 mg a day.  She is going to continue on this dose.      Neurologic followup will be arranged for 9 months.    Total visit time today was 12 minutes.  This included reviewing interim history, examination, and documentation.    Jaswant Blue MD        D: 2021   T: 2021   MT: pushpa    Name:     HODA PHIPPS  MRN:      [id number]        Account:    [id number]   :      1940           Visit Date: 2021     Document: [id number]

## 2021-06-24 NOTE — NURSING NOTE
"Hoda Rebolledo's goals for this visit include: return  She requests these members of her care team be copied on today's visit information:     PCP: Jose Grande    Referring Provider:  No referring provider defined for this encounter.    /70   Pulse 59   Ht 1.6 m (5' 3\")   Wt 77.6 kg (171 lb)   SpO2 97%   BMI 30.29 kg/m      Do you need any medication refills at today's visit? n  "

## 2021-06-24 NOTE — LETTER
2021         RE: Hoda Phipps  [address]  [address]  [place] [address]        Dear Colleague,    Thank you for referring your patient, Hoda Phipps, to the [facility] [place]. Please see a copy of my visit note below.    Visit Date: 2021    Hoda Phipps returns to see how she did with an increase in her propranolol dose.  She is a patient with an essential tremor, likely a familial tremor.  She had been on 80 mg of propranolol, which she found helpful, but had not been fully effective for controlling her tremor adequately.  In the past, she went up to 160 mg, but it made her feel lightheaded.  It was decided to try going to the 120 mg dose.    I again note that previous trials of primidone and gabapentin were not effective.    She has tolerated the current dose of propranolol well and she feels it has helped her tremor on the higher dose.  She is able to eat and drink a lot better and she is very pleased about this.    PHYSICAL EXAMINATION:    VITAL SIGNS:  Reveals her heart rate is 59.  Blood pressure 130/70.  NEUROLOGIC:  She has a slight head tremor.  She has only a very mild postural tremor.  She has a moderately severe action tremor of both upper extremities.  There is no rest tremor.  She can get up and ambulate without any difficulty.    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    PLAN:  She is comfortable with how she is doing with the long-acting propranolol 120 mg a day.  She is going to continue on this dose.      Neurologic followup will be arranged for 9 months.    Total visit time today was 12 minutes.  This included reviewing interim history, examination, and documentation.    Jaswant Blue MD        D: 2021   T: 2021   MT: pushpa    Name:     HODA PHIPPS  MRN:      [id number]        Account:    [id number]   :      1940           Visit Date: 2021     Document: [id number]        Again, thank  you for allowing me to participate in the care of your patient.        Sincerely,        Jaswant Blue MD

## 2021-09-12 ENCOUNTER — HEALTH MAINTENANCE LETTER (OUTPATIENT)
Age: 81
End: 2021-09-12

## 2022-04-06 ENCOUNTER — TELEPHONE (OUTPATIENT)
Dept: NEUROLOGY | Facility: CLINIC | Age: 82
End: 2022-04-06
Payer: MEDICARE

## 2022-04-06 DIAGNOSIS — G25.0 FAMILIAL TREMOR: ICD-10-CM

## 2022-04-06 DIAGNOSIS — G25.0 ESSENTIAL TREMOR: ICD-10-CM

## 2022-04-06 RX ORDER — PROPRANOLOL HYDROCHLORIDE 120 MG/1
120 CAPSULE, EXTENDED RELEASE ORAL DAILY
Qty: 90 CAPSULE | Refills: 0 | Status: SHIPPED | OUTPATIENT
Start: 2022-04-06 | End: 2022-12-27

## 2022-04-06 NOTE — TELEPHONE ENCOUNTER
Refill request for Propranolol ER. Pt gets this filled by Dr. Blue. Will defer to them.     Yessy Parsons RN on 4/6/2022 at 10:50 AM

## 2022-04-06 NOTE — TELEPHONE ENCOUNTER
[facility] Call Center    Phone Message    May a detailed message be left on voicemail: yes     Reason for Call: Medication Refill Request    Has the patient contacted the pharmacy for the refill? Yes   Name of medication being requested: propranolol ER (INDERAL LA) 120 MG 24 hr capsule  Provider who prescribed the medication: Previously Dr Blue and Dr Brady changing to Dr Prabhakar   Pharmacy: [id number] [address] [facility] [address]  Date medication is needed: ASAP pt is completely out of her medication          Action Taken: Message routed to:  [facility] neurology    Travel Screening: Not Applicable

## 2022-04-06 NOTE — TELEPHONE ENCOUNTER
Dr Blue has retired and is scheduled to see Dr Gerber on 4/25/22. Will forward to the [facility] team to address.     Elizabeth Ojeda RN   Neurology Care Coordinator

## 2022-04-24 ENCOUNTER — HEALTH MAINTENANCE LETTER (OUTPATIENT)
Age: 82
End: 2022-04-24

## 2022-04-29 ENCOUNTER — TELEPHONE (OUTPATIENT)
Dept: NEUROLOGY | Facility: CLINIC | Age: 82
End: 2022-04-29

## 2022-04-29 ENCOUNTER — OFFICE VISIT (OUTPATIENT)
Dept: NEUROLOGY | Facility: CLINIC | Age: 82
End: 2022-04-29
Payer: MEDICARE

## 2022-04-29 VITALS
HEART RATE: 63 BPM | WEIGHT: 173.2 LBS | BODY MASS INDEX: 30.69 KG/M2 | HEIGHT: 63 IN | SYSTOLIC BLOOD PRESSURE: 143 MMHG | DIASTOLIC BLOOD PRESSURE: 75 MMHG

## 2022-04-29 DIAGNOSIS — Z79.899 ENCOUNTER FOR LONG-TERM (CURRENT) USE OF MEDICATIONS: ICD-10-CM

## 2022-04-29 DIAGNOSIS — G25.0 ESSENTIAL TREMOR: Primary | ICD-10-CM

## 2022-04-29 PROCEDURE — 99204 OFFICE O/P NEW MOD 45 MIN: CPT | Performed by: PSYCHIATRY & NEUROLOGY

## 2022-04-29 RX ORDER — PROPRANOLOL HYDROCHLORIDE 20 MG/1
TABLET ORAL
Qty: 630 TABLET | Refills: 1 | Status: SHIPPED | OUTPATIENT
Start: 2022-04-29 | End: 2022-12-27

## 2022-04-29 RX ORDER — PROPRANOLOL HYDROCHLORIDE 20 MG/1
20 TABLET ORAL DAILY
Qty: 90 TABLET | Refills: 0 | Status: SHIPPED | OUTPATIENT
Start: 2022-04-29 | End: 2022-12-27

## 2022-04-29 NOTE — TELEPHONE ENCOUNTER
[facility] Call Center    Phone Message    May a detailed message be left on voicemail: yes     Reason for Call: Medication Question or concern regarding medication   Prescription Clarification  Name of Medication: propranolol (INDERAL) 20 MG tablet  Prescribing Provider: Dr. Zabrina Prabhakar   Pharmacy: [id number] [address]   What on the order needs clarification? Mont[place] at pharmacy called with questions about the 2 prescriptions sent for the above medication with varying  Instructions.    Please call pharmacy back at [phone] to clarify instructions.          Action Taken: Message routed to:  Other: [facility]    Travel Screening: Not Applicable

## 2022-04-29 NOTE — NURSING NOTE
Chief Complaint   Patient presents with     Tremors     Transfer care Dr. Su Ray CMA@ on 4/29/2022 at 12:37 PM

## 2022-04-29 NOTE — PROGRESS NOTES
INITIAL NEUROLOGY CONSULTATION    DATE OF VISIT: 4/29/2022  MRN: [id number]  PATIENT NAME: Hoda Rebolledo  YOB: 1940    REFERRING PROVIDER: Jaswant Blue    Chief Complaint   Patient presents with     Tremors     Transfer care Dr. Blue       SUBJECTIVE:                                                      HPI:   Hoda Rebolledo is a 81 year old female whom I have been asked by Dr. Blue to see in consultation for tremor.  She followed with Dr. Blue through the neurology clinic at the [facility].  Per chart review, she has history of essential tremor, felt to be familial.   Tremor reportedly has affected the hands and the head, action>postural. Primidone and gabapentin were not effective treatments for her.  She is currently on propranolol ER: 120mg daily.  Lower dosing was not effective in controlling her tremor and she experienced lightheadedness on higher dosing (160mg daily).  There has been discussion in the past about trying Topamax or clonazepam for the tremor if needed in the future.    When she last met with Dr. Blue in 6.2021, she reported good tremor control with improvements in her ability to eat and drink.    Danni says that the tremor is bad because her anxiety is up due to  being sick.  He has been having increased neck weakness and some gait changes and it sounds like his neurologist has settled on a diagnosis of myasthenia gravis but the work-up is still underway.  When she is anxious the tremor is worse.  She is now having problems with eating.  She gives me the example that she has to use 2 hands to get her toothbrush into her mouth but once it is there she does not need to do much because the tremor does not work.  No problems with balance, and no changes in gait.    Danni tells me that her maternal grandmother, mother and siblings all have a similar tremor.  She mentions that the extended release propranolol is somewhat expensive.  She wonders if the  "dose could be increased further at this point.  She denies any lightheadedness on the medication.    She does endorse some neuropathy symptoms related to her diabetes.      No past medical history on file.  No past surgical history on file.    Calcium Carb-Cholecalciferol 600-800 MG-UNIT TABS,   Cholecalciferol (VITAMIN D) 2000 UNITS CAPS, Take 1 tablet by mouth daily.   metFORMIN (GLUCOPHAGE-XR) 500 MG 24 hr tablet, Take 1,000 mg by mouth 2 times daily (with meals)  propranolol ER (INDERAL LA) 120 MG 24 hr capsule, Take 1 capsule (120 mg) by mouth daily  simvastatin (ZOCOR) 20 MG tablet, Take 1 tablet by mouth At Bedtime    No current facility-administered medications on file prior to visit.    No Known Allergies      Social History     Tobacco Use     Smoking status: Never Smoker     Smokeless tobacco: Never Used   Substance Use Topics     Alcohol use: Yes     Comment: very little     Drug use: No       REVIEW OF SYSTEMS:                                                      10-point review of systems is negative except as mentioned above in HPI.     EXAM:                                                      Physical Exam:   Vitals: BP (!) 143/75 (BP Location: Right arm, Patient Position: Sitting)   Pulse 63   Ht 1.6 m (5' 3\")   Wt 78.6 kg (173 lb 3.2 oz)   BMI 30.68 kg/m    BMI= Body mass index is 30.68 kg/m .  GENERAL: NAD.  HEENT: NC/AT.   PULM: Non-labored breathing.   Neurologic:  MENTAL STATUS: Alert, attentive. Speech is fluent.  Voice tremulous.  Normal comprehension. Normal concentration. Adequate fund of knowledge.   CRANIAL NERVES: Discs technically difficult to visualize. Visual fields intact to confrontation. Pupils equally, round and reactive to light. Facial sensation and movement normal. EOM full. Hearing intact to conversation. Trapezius strength intact. Palate moves symmetrically. Tongue midline.  MOTOR: 5/5 in proximal and distal muscle groups of upper and lower extremities. Tone and bulk " "normal.   DTRs: Intact and symmetric in biceps, BR, patellae.  Absent ankle jerks.  Babinski down-going bilaterally.   SENSATION: Normal light touch and pinprick. Intact proprioception at great toes. Vibration: Diminished at both ankles.   COORDINATION: Normal finger nose finger. Finger tapping normal. Knee heel shin normal.  STATION AND GAIT: Sway with Romberg. Good postural reflexes. Casual gait is normal.  Good step length and armswing.  TREMOR: Action>postural tremor in the hands with moderate amplitude and frequency.  Right is worse than the left.  Near constant \"no-no\" head tremor, with intermittent jaw tremor.  Right hand-dominant.    ASSESSMENT and PLAN:                                                      Assessment:     ICD-10-CM    1. Essential tremor  G25.0 propranolol (INDERAL) 20 MG tablet     propranolol (INDERAL) 20 MG tablet   2. Encounter for long-term (current) use of medications  Z79.899 propranolol (INDERAL) 20 MG tablet     propranolol (INDERAL) 20 MG tablet        Ms. Rebolledo is a pleasant 81-year-old woman here to establish care for essential tremor.  Her tremor affects her hands and her head primarily.  Symptoms have worsened with some increased stress related to her 's health.  She is again having trouble with feeding herself because of the tremor.  She mentions that the extended release formulation of the propranolol is somewhat expensive for her.  She recalls being on the short acting 3 times a day.  She would like to switch to the short acting formulation once she uses up what she has, but I think we can dose it just twice daily rather than 3 times daily to make things simpler.  We will bump the daily dose up to 140mg to start and then see how things go.  Potential side effects were reviewed.  I would like to see Danni back in clinic again in about 6 months.  We may want to make some further adjustments further in the meantime.  She understands and agrees with the plan.    Plan:  -- " Let's try increasing the Propranolol to 140mg to start.   -- You can use up the extended release capsules that you have at home for the next couple of months.  We will simply add a 20mg tablet to this, also to be taken once daily.  -- Thereafter, we can switch you to the short-acting formulation: 60mg in the morning and 80mg in the evening.  We have room to increase further if need be, depending on your response.  Feel free to keep me posted on how you are doing.  -- Follow-up in Neurology clinic in 6 months.    Total Time: 45 minutes were spent with the patient and in chart review/documentation (as described above in Assessment and Plan) /coordinating the care on date of service.    Beryl Prabhakar MD  Neurology    CC: [employer] software used in the dictation of this note.

## 2022-04-29 NOTE — PATIENT INSTRUCTIONS
Plan:  -- Let's try increasing the Propranolol to 140mg to start.   -- You can use up the extended release capsules that you have at home for the next couple of months.  We will simply add a 20mg tablet to this, also to be taken once daily.  -- Thereafter, we can switch you to the short-acting formulation: 60mg in the morning and 80mg in the evening.  We have room to increase further if need be, depending on your response.  Feel free to keep me posted on how you are doing.  -- Follow-up in Neurology clinic in 6 months.

## 2022-04-29 NOTE — TELEPHONE ENCOUNTER
Spoke with [place] at [facility] and relayed Propranolol order. She verbalized understanding.     Yessy Parsons RN on 4/29/2022 at 2:44 PM

## 2022-04-29 NOTE — LETTER
4/29/2022         RE: Hoda Rebolledo  [address]  [address]  [place] [facility] [facility] [address]        Dear Colleague,    Thank you for referring your patient, Hoda Rebolledo, to the [facility] [place]. Please see a copy of my visit note below.    INITIAL NEUROLOGY CONSULTATION    DATE OF VISIT: 4/29/2022  MRN: [id number]  PATIENT NAME: Hoda Rebolledo  YOB: 1940    REFERRING PROVIDER: Jaswant Blue    Chief Complaint   Patient presents with     Tremors     Transfer care Dr. Blue       SUBJECTIVE:                                                      HPI:   Hoda Rebolledo is a 81 year old female whom I have been asked by Dr. Blue to see in consultation for tremor.  She followed with Dr. Blue through the neurology clinic at the [facility].  Per chart review, she has history of essential tremor, felt to be familial.   Tremor reportedly has affected the hands and the head, action>postural. Primidone and gabapentin were not effective treatments for her.  She is currently on propranolol ER: 120mg daily.  Lower dosing was not effective in controlling her tremor and she experienced lightheadedness on higher dosing (160mg daily).  There has been discussion in the past about trying Topamax or clonazepam for the tremor if needed in the future.    When she last met with Dr. Blue in 6.2021, she reported good tremor control with improvements in her ability to eat and drink.    Danni says that the tremor is bad because her anxiety is up due to  being sick.  He has been having increased neck weakness and some gait changes and it sounds like his neurologist has settled on a diagnosis of myasthenia gravis but the work-up is still underway.  When she is anxious the tremor is worse.  She is now having problems with eating.  She gives me the example that she has to use 2 hands to get her toothbrush into her mouth but once it is there she does not need to do much  "because the tremor does not work.  No problems with balance, and no changes in gait.    Danni tells me that her maternal grandmother, mother and siblings all have a similar tremor.  She mentions that the extended release propranolol is somewhat expensive.  She wonders if the dose could be increased further at this point.  She denies any lightheadedness on the medication.    She does endorse some neuropathy symptoms related to her diabetes.      No past medical history on file.  No past surgical history on file.    Calcium Carb-Cholecalciferol 600-800 MG-UNIT TABS,   Cholecalciferol (VITAMIN D) 2000 UNITS CAPS, Take 1 tablet by mouth daily.   metFORMIN (GLUCOPHAGE-XR) 500 MG 24 hr tablet, Take 1,000 mg by mouth 2 times daily (with meals)  propranolol ER (INDERAL LA) 120 MG 24 hr capsule, Take 1 capsule (120 mg) by mouth daily  simvastatin (ZOCOR) 20 MG tablet, Take 1 tablet by mouth At Bedtime    No current facility-administered medications on file prior to visit.    No Known Allergies      Social History     Tobacco Use     Smoking status: Never Smoker     Smokeless tobacco: Never Used   Substance Use Topics     Alcohol use: Yes     Comment: very little     Drug use: No       REVIEW OF SYSTEMS:                                                      10-point review of systems is negative except as mentioned above in HPI.     EXAM:                                                      Physical Exam:   Vitals: BP (!) 143/75 (BP Location: Right arm, Patient Position: Sitting)   Pulse 63   Ht 1.6 m (5' 3\")   Wt 78.6 kg (173 lb 3.2 oz)   BMI 30.68 kg/m    BMI= Body mass index is 30.68 kg/m .  GENERAL: NAD.  HEENT: NC/AT.   PULM: Non-labored breathing.   Neurologic:  MENTAL STATUS: Alert, attentive. Speech is fluent.  Voice tremulous.  Normal comprehension. Normal concentration. Adequate fund of knowledge.   CRANIAL NERVES: Discs technically difficult to visualize. Visual fields intact to confrontation. Pupils equally, " "round and reactive to light. Facial sensation and movement normal. EOM full. Hearing intact to conversation. Trapezius strength intact. Palate moves symmetrically. Tongue midline.  MOTOR: 5/5 in proximal and distal muscle groups of upper and lower extremities. Tone and bulk normal.   DTRs: Intact and symmetric in biceps, BR, patellae.  Absent ankle jerks.  Babinski down-going bilaterally.   SENSATION: Normal light touch and pinprick. Intact proprioception at great toes. Vibration: Diminished at both ankles.   COORDINATION: Normal finger nose finger. Finger tapping normal. Knee heel shin normal.  STATION AND GAIT: Sway with Romberg. Good postural reflexes. Casual gait is normal.  Good step length and armswing.  TREMOR: Action>postural tremor in the hands with moderate amplitude and frequency.  Right is worse than the left.  Near constant \"no-no\" head tremor, with intermittent jaw tremor.  Right hand-dominant.    ASSESSMENT and PLAN:                                                      Assessment:     ICD-10-CM    1. Essential tremor  G25.0 propranolol (INDERAL) 20 MG tablet     propranolol (INDERAL) 20 MG tablet   2. Encounter for long-term (current) use of medications  Z79.899 propranolol (INDERAL) 20 MG tablet     propranolol (INDERAL) 20 MG tablet        Ms. Rebolledo is a pleasant 81-year-old woman here to establish care for essential tremor.  Her tremor affects her hands and her head primarily.  Symptoms have worsened with some increased stress related to her 's health.  She is again having trouble with feeding herself because of the tremor.  She mentions that the extended release formulation of the propranolol is somewhat expensive for her.  She recalls being on the short acting 3 times a day.  She would like to switch to the short acting formulation once she uses up what she has, but I think we can dose it just twice daily rather than 3 times daily to make things simpler.  We will bump the daily dose up to " 140mg to start and then see how things go.  Potential side effects were reviewed.  I would like to see Danni back in clinic again in about 6 months.  We may want to make some further adjustments further in the meantime.  She understands and agrees with the plan.    Plan:  -- Let's try increasing the Propranolol to 140mg to start.   -- You can use up the extended release capsules that you have at home for the next couple of months.  We will simply add a 20mg tablet to this, also to be taken once daily.  -- Thereafter, we can switch you to the short-acting formulation: 60mg in the morning and 80mg in the evening.  We have room to increase further if need be, depending on your response.  Feel free to keep me posted on how you are doing.  -- Follow-up in Neurology clinic in 6 months.    Total Time: 45 minutes were spent with the patient and in chart review/documentation (as described above in Assessment and Plan) /coordinating the care on date of service.    Beryl Prabhakar MD  Neurology    CC: [employer] software used in the dictation of this note.        Again, thank you for allowing me to participate in the care of your patient.        Sincerely,        Beryl Prabhakar MD

## 2022-09-12 RX ORDER — IPRATROPIUM BROMIDE 21 UG/1
2 SPRAY, METERED NASAL 3 TIMES DAILY PRN
COMMUNITY

## 2022-09-16 ENCOUNTER — ANESTHESIA EVENT (OUTPATIENT)
Dept: SURGERY | Facility: CLINIC | Age: 82
End: 2022-09-16
Payer: MEDICARE

## 2022-09-19 ENCOUNTER — ANESTHESIA (OUTPATIENT)
Dept: SURGERY | Facility: CLINIC | Age: 82
End: 2022-09-19
Payer: MEDICARE

## 2022-09-19 ENCOUNTER — HOSPITAL ENCOUNTER (OUTPATIENT)
Facility: CLINIC | Age: 82
Discharge: HOME | End: 2022-09-19
Attending: INTERNAL MEDICINE | Admitting: INTERNAL MEDICINE
Payer: MEDICARE

## 2022-09-19 VITALS
SYSTOLIC BLOOD PRESSURE: 111 MMHG | WEIGHT: 164.5 LBS | DIASTOLIC BLOOD PRESSURE: 56 MMHG | HEART RATE: 60 BPM | BODY MASS INDEX: 29.15 KG/M2 | RESPIRATION RATE: 14 BRPM | OXYGEN SATURATION: 99 % | HEIGHT: 63 IN | TEMPERATURE: 97.2 F

## 2022-09-19 LAB
COLONOSCOPY: NORMAL
GLUCOSE BLDC GLUCOMTR-MCNC: 174 MG/DL (ref 70–99)

## 2022-09-19 PROCEDURE — 999N000141 HC STATISTIC PRE-PROCEDURE NURSING ASSESSMENT: Performed by: INTERNAL MEDICINE

## 2022-09-19 PROCEDURE — 370N000017 HC ANESTHESIA TECHNICAL FEE, PER MIN: Performed by: INTERNAL MEDICINE

## 2022-09-19 PROCEDURE — 82962 GLUCOSE BLOOD TEST: CPT

## 2022-09-19 PROCEDURE — 272N000001 HC OR GENERAL SUPPLY STERILE: Performed by: INTERNAL MEDICINE

## 2022-09-19 PROCEDURE — 258N000003 HC RX IP 258 OP 636: Performed by: ANESTHESIOLOGY

## 2022-09-19 PROCEDURE — 710N000012 HC RECOVERY PHASE 2, PER MINUTE: Performed by: INTERNAL MEDICINE

## 2022-09-19 PROCEDURE — 258N000003 HC RX IP 258 OP 636: Performed by: NURSE ANESTHETIST, CERTIFIED REGISTERED

## 2022-09-19 PROCEDURE — 88305 TISSUE EXAM BY PATHOLOGIST: CPT | Mod: TC | Performed by: INTERNAL MEDICINE

## 2022-09-19 PROCEDURE — 250N000011 HC RX IP 250 OP 636: Performed by: NURSE ANESTHETIST, CERTIFIED REGISTERED

## 2022-09-19 PROCEDURE — 360N000075 HC SURGERY LEVEL 2, PER MIN: Performed by: INTERNAL MEDICINE

## 2022-09-19 RX ORDER — PROCHLORPERAZINE MALEATE 5 MG
5 TABLET ORAL EVERY 6 HOURS PRN
Status: CANCELLED | OUTPATIENT
Start: 2022-09-19

## 2022-09-19 RX ORDER — SODIUM CHLORIDE, SODIUM LACTATE, POTASSIUM CHLORIDE, CALCIUM CHLORIDE 600; 310; 30; 20 MG/100ML; MG/100ML; MG/100ML; MG/100ML
INJECTION, SOLUTION INTRAVENOUS CONTINUOUS
Status: DISCONTINUED | OUTPATIENT
Start: 2022-09-19 | End: 2022-09-19 | Stop reason: HOSPADM

## 2022-09-19 RX ORDER — ONDANSETRON 2 MG/ML
4 INJECTION INTRAMUSCULAR; INTRAVENOUS EVERY 30 MIN PRN
Status: DISCONTINUED | OUTPATIENT
Start: 2022-09-19 | End: 2022-09-19 | Stop reason: HOSPADM

## 2022-09-19 RX ORDER — ONDANSETRON 4 MG/1
4 TABLET, ORALLY DISINTEGRATING ORAL EVERY 6 HOURS PRN
Status: CANCELLED | OUTPATIENT
Start: 2022-09-19

## 2022-09-19 RX ORDER — PROPOFOL 10 MG/ML
INJECTION, EMULSION INTRAVENOUS CONTINUOUS PRN
Status: DISCONTINUED | OUTPATIENT
Start: 2022-09-19 | End: 2022-09-19

## 2022-09-19 RX ORDER — FENTANYL CITRATE 50 UG/ML
25 INJECTION, SOLUTION INTRAMUSCULAR; INTRAVENOUS EVERY 5 MIN PRN
Status: CANCELLED | OUTPATIENT
Start: 2022-09-19

## 2022-09-19 RX ORDER — NALOXONE HYDROCHLORIDE 0.4 MG/ML
0.4 INJECTION, SOLUTION INTRAMUSCULAR; INTRAVENOUS; SUBCUTANEOUS
Status: CANCELLED | OUTPATIENT
Start: 2022-09-19

## 2022-09-19 RX ORDER — PROPOFOL 10 MG/ML
INJECTION, EMULSION INTRAVENOUS PRN
Status: DISCONTINUED | OUTPATIENT
Start: 2022-09-19 | End: 2022-09-19

## 2022-09-19 RX ORDER — NALOXONE HYDROCHLORIDE 0.4 MG/ML
0.2 INJECTION, SOLUTION INTRAMUSCULAR; INTRAVENOUS; SUBCUTANEOUS
Status: CANCELLED | OUTPATIENT
Start: 2022-09-19

## 2022-09-19 RX ORDER — ONDANSETRON 2 MG/ML
INJECTION INTRAMUSCULAR; INTRAVENOUS PRN
Status: DISCONTINUED | OUTPATIENT
Start: 2022-09-19 | End: 2022-09-19

## 2022-09-19 RX ORDER — LIDOCAINE 40 MG/G
CREAM TOPICAL
Status: DISCONTINUED | OUTPATIENT
Start: 2022-09-19 | End: 2022-09-19 | Stop reason: HOSPADM

## 2022-09-19 RX ORDER — HYDROMORPHONE HCL IN WATER/PF 6 MG/30 ML
0.2 PATIENT CONTROLLED ANALGESIA SYRINGE INTRAVENOUS EVERY 5 MIN PRN
Status: CANCELLED | OUTPATIENT
Start: 2022-09-19

## 2022-09-19 RX ORDER — FENTANYL CITRATE 50 UG/ML
25 INJECTION, SOLUTION INTRAMUSCULAR; INTRAVENOUS
Status: CANCELLED | OUTPATIENT
Start: 2022-09-19

## 2022-09-19 RX ORDER — ONDANSETRON 2 MG/ML
4 INJECTION INTRAMUSCULAR; INTRAVENOUS EVERY 6 HOURS PRN
Status: CANCELLED | OUTPATIENT
Start: 2022-09-19

## 2022-09-19 RX ORDER — FLUMAZENIL 0.1 MG/ML
0.2 INJECTION, SOLUTION INTRAVENOUS
Status: CANCELLED | OUTPATIENT
Start: 2022-09-19 | End: 2022-09-19

## 2022-09-19 RX ORDER — OXYCODONE HYDROCHLORIDE 5 MG/1
5 TABLET ORAL EVERY 4 HOURS PRN
Status: DISCONTINUED | OUTPATIENT
Start: 2022-09-19 | End: 2022-09-19 | Stop reason: HOSPADM

## 2022-09-19 RX ORDER — ONDANSETRON 4 MG/1
4 TABLET, ORALLY DISINTEGRATING ORAL EVERY 30 MIN PRN
Status: DISCONTINUED | OUTPATIENT
Start: 2022-09-19 | End: 2022-09-19 | Stop reason: HOSPADM

## 2022-09-19 RX ORDER — MEPERIDINE HYDROCHLORIDE 25 MG/ML
12.5 INJECTION INTRAMUSCULAR; INTRAVENOUS; SUBCUTANEOUS
Status: DISCONTINUED | OUTPATIENT
Start: 2022-09-19 | End: 2022-09-19 | Stop reason: HOSPADM

## 2022-09-19 RX ADMIN — PHENYLEPHRINE HYDROCHLORIDE 100 MCG: 10 INJECTION INTRAVENOUS at 07:58

## 2022-09-19 RX ADMIN — ONDANSETRON 4 MG: 2 INJECTION INTRAMUSCULAR; INTRAVENOUS at 07:45

## 2022-09-19 RX ADMIN — PROPOFOL 50 MG: 10 INJECTION, EMULSION INTRAVENOUS at 07:45

## 2022-09-19 RX ADMIN — SODIUM CHLORIDE, POTASSIUM CHLORIDE, SODIUM LACTATE AND CALCIUM CHLORIDE: 600; 310; 30; 20 INJECTION, SOLUTION INTRAVENOUS at 06:59

## 2022-09-19 RX ADMIN — PHENYLEPHRINE HYDROCHLORIDE 100 MCG: 10 INJECTION INTRAVENOUS at 08:04

## 2022-09-19 RX ADMIN — PHENYLEPHRINE HYDROCHLORIDE 100 MCG: 10 INJECTION INTRAVENOUS at 07:45

## 2022-09-19 RX ADMIN — PROPOFOL 100 MCG/KG/MIN: 10 INJECTION, EMULSION INTRAVENOUS at 07:45

## 2022-09-19 RX ADMIN — PHENYLEPHRINE HYDROCHLORIDE 100 MCG: 10 INJECTION INTRAVENOUS at 07:52

## 2022-09-19 ASSESSMENT — ACTIVITIES OF DAILY LIVING (ADL): ADLS_ACUITY_SCORE: 39

## 2022-09-19 NOTE — ANESTHESIA POSTPROCEDURE EVALUATION
Patient: Hoda Rebolledo    Procedure: Procedure(s):  COLONOSCOPY with polypectomy       Anesthesia Type:  MAC    Note:     Postop Pain Control: Uneventful            Sign Out: Well controlled pain   PONV: No   Neuro/Psych: Uneventful            Sign Out: Acceptable/Baseline neuro status   Airway/Respiratory: Uneventful            Sign Out: Acceptable/Baseline resp. status   CV/Hemodynamics: Uneventful            Sign Out: Acceptable CV status   Other NRE: NONE   DID A NON-ROUTINE EVENT OCCUR? No           Last vitals:  Vitals Value Taken Time   /56 09/19/22 0830   Temp 36.2  C (97.2  F) 09/19/22 0817   Pulse 59 09/19/22 0840   Resp 14 09/19/22 0817   SpO2 98 % 09/19/22 0840   Vitals shown include unvalidated device data.    Electronically Signed By: Neal Ayala MD  September 19, 2022  2:13 PM

## 2022-09-19 NOTE — ANESTHESIA PREPROCEDURE EVALUATION
Anesthesia Pre-Procedure Evaluation    Patient: Hoda Rebolledo   MRN: [id number] : 1940        Procedure : Procedure(s):  COLONOSCOPY          No past medical history on file.   No past surgical history on file.   No Known Allergies   Social History     Tobacco Use     Smoking status: Never Smoker     Smokeless tobacco: Never Used   Substance Use Topics     Alcohol use: Yes     Comment: very little      Wt Readings from Last 1 Encounters:   22 78.6 kg (173 lb 3.2 oz)        Anesthesia Evaluation            ROS/MED HX  ENT/Pulmonary:  - neg pulmonary ROS     Neurologic:  - neg neurologic ROS     Cardiovascular:  - neg cardiovascular ROS     METS/Exercise Tolerance:     Hematologic:  - neg hematologic  ROS     Musculoskeletal:  - neg musculoskeletal ROS     GI/Hepatic:  - neg GI/hepatic ROS     Renal/Genitourinary:  - neg Renal ROS     Endo:  - neg endo ROS     Psychiatric/Substance Use:  - neg psychiatric ROS     Infectious Disease:  - neg infectious disease ROS     Malignancy:  - neg malignancy ROS     Other:  - neg other ROS          Physical Exam    Airway  airway exam normal      Mallampati: II       Respiratory Devices and Support         Dental  no notable dental history         Cardiovascular   cardiovascular exam normal          Pulmonary   pulmonary exam normal                OUTSIDE LABS:  CBC: No results found for: WBC, HGB, HCT, PLT  BMP: No results found for: NA, POTASSIUM, CHLORIDE, CO2, BUN, CR, GLC  COAGS: No results found for: PTT, INR, FIBR  POC: No results found for: BGM, HCG, HCGS  HEPATIC: No results found for: ALBUMIN, PROTTOTAL, ALT, AST, GGT, ALKPHOS, BILITOTAL, BILIDIRECT, GLYNN  OTHER: No results found for: PH, LACT, A1C, MARILIN, PHOS, MAG, LIPASE, AMYLASE, TSH, T4, T3, CRP, SED    Anesthesia Plan    ASA Status:  3      Anesthesia Type: MAC.              Consents    Anesthesia Plan(s) and associated risks, benefits, and realistic alternatives discussed. Questions answered and  patient/representative(s) expressed understanding.    - Discussed:     - Discussed with:  Patient         Postoperative Care            Comments:                Neal Ayala MD

## 2022-09-19 NOTE — H&P
9/19/2022  6:15 AM    Pre-procedure Note    Reason for procedure: History polyps      History and Physical Reviewed: Reviewed, no changes.    Pre-sedation assessment:    General: alert, appears stated age and cooperative  Airway: normal  Heart: S1, S2 normal, no murmur, click, rub or gallop, regular rate and rhythm, chest is clear without rales or wheezing, no pedal edema, no JVD, no hepatosplenomegaly  Lungs: clear to auscultation bilaterally    Sedation Plan based on assessment: Moderate    Mallampati score: Class II (visualization of the soft palate, fauces, and uvula)          ASA Classification: ASA 2 - Patient with mild systemic disease with no functional limitations    Impression: Patient deemed adequate candidate for conscious sedation    Risks, benefits and alternatives were discussed with the patient and informed consent was obtained.    Plan: colonoscopy                                                    Stan Arellano MD  Thank you for the opportunity to participate in the care of this patient.   Please feel free to call me with any questions or concerns.  Phone number [phone].

## 2022-09-19 NOTE — ANESTHESIA CARE TRANSFER NOTE
Patient: Hoda Rebolledo    Procedure: Procedure(s):  COLONOSCOPY with polypectomy       Diagnosis: Screening for colon cancer [Z12.11]  Diagnosis Additional Information: No value filed.    Anesthesia Type:   MAC     Note:    Oropharynx: oropharynx clear of all foreign objects  Level of Consciousness: awake  Oxygen Supplementation: face mask  Level of Supplemental Oxygen (L/min / FiO2): 6      Vital Signs Stable: post-procedure vital signs reviewed and stable  Report to RN Given: handoff report given  Patient transferred to: Phase II    Handoff Report: Identifed the Patient, Identified the Reponsible Provider, Reviewed the pertinent medical history, Discussed the surgical course, Reviewed Intra-OP anesthesia mangement and issues during anesthesia, Set expectations for post-procedure period and Allowed opportunity for questions and acknowledgement of understanding      Vitals:  Vitals Value Taken Time   /51 09/19/22 0818   Temp 36.2  C (97.2  F) 09/19/22 0817   Pulse 61 09/19/22 0818   Resp 14 09/19/22 0817   SpO2 100 % 09/19/22 0818   Vitals shown include unvalidated device data.    Electronically Signed By: SANTIAGO Luke CRNA  September 19, 2022  8:19 AM

## 2022-09-20 LAB
PATH REPORT.COMMENTS IMP SPEC: NORMAL
PATH REPORT.COMMENTS IMP SPEC: NORMAL
PATH REPORT.FINAL DX SPEC: NORMAL
PATH REPORT.GROSS SPEC: NORMAL
PATH REPORT.MICROSCOPIC SPEC OTHER STN: NORMAL
PATH REPORT.RELEVANT HX SPEC: NORMAL
PHOTO IMAGE: NORMAL

## 2022-09-20 PROCEDURE — 88305 TISSUE EXAM BY PATHOLOGIST: CPT | Mod: 26 | Performed by: PATHOLOGY

## 2022-10-31 ENCOUNTER — OFFICE VISIT (OUTPATIENT)
Dept: NEUROLOGY | Facility: CLINIC | Age: 82
End: 2022-10-31
Payer: MEDICARE

## 2022-10-31 VITALS
SYSTOLIC BLOOD PRESSURE: 131 MMHG | DIASTOLIC BLOOD PRESSURE: 71 MMHG | WEIGHT: 167 LBS | HEART RATE: 72 BPM | BODY MASS INDEX: 29.58 KG/M2

## 2022-10-31 DIAGNOSIS — Z79.899 ENCOUNTER FOR LONG-TERM (CURRENT) USE OF MEDICATIONS: ICD-10-CM

## 2022-10-31 DIAGNOSIS — G25.0 ESSENTIAL TREMOR: Primary | ICD-10-CM

## 2022-10-31 PROCEDURE — 99213 OFFICE O/P EST LOW 20 MIN: CPT | Performed by: PSYCHIATRY & NEUROLOGY

## 2022-10-31 RX ORDER — PROPRANOLOL HYDROCHLORIDE 60 MG/1
60 TABLET ORAL 2 TIMES DAILY
Qty: 60 TABLET | Refills: 5 | Status: SHIPPED | OUTPATIENT
Start: 2022-10-31 | End: 2022-12-27

## 2022-10-31 NOTE — NURSING NOTE
"Hoda Rebolledo is a 82 year old female who presents for:  Chief Complaint   Patient presents with     Tremors     6 mo follow-up         Initial Vitals:  /71   Pulse 72   Wt 75.8 kg (167 lb)   BMI 29.58 kg/m   Estimated body mass index is 29.58 kg/m  as calculated from the following:    Height as of 9/19/22: 1.6 m (5' 3\").    Weight as of this encounter: 75.8 kg (167 lb).. Body surface area is 1.84 meters squared. BP completed using cuff size: wrist cuff    Moose Curry  "

## 2022-10-31 NOTE — LETTER
10/31/2022         RE: Hoda Rebolledo  [address]  [address]  [facility] [address]        Dear Colleague,    Thank you for referring your patient, Hoda Rebolledo, to the [facility] [place]. Please see a copy of my visit note below.    ESTABLISHED PATIENT NEUROLOGY NOTE    DATE OF VISIT: 10/31/2022  MRN: [id number]  PATIENT NAME: Hoda Rebolledo  YOB: 1940    Chief Complaint   Patient presents with     Tremors     6 mo follow-up      SUBJECTIVE:                                                      HISTORY OF PRESENT ILLNESS:  Hoda is here for follow up regarding essential tremor.    History as previously documented by me (4.29.22):   She followed with Dr. Blue through the neurology clinic at the [facility].  Per chart review, she has history of essential tremor, felt to be familial.   Tremor reportedly has affected the hands and the head, action>postural. Primidone and gabapentin were not effective treatments for her.  She is currently on propranolol ER: 120mg daily.  Lower dosing was not effective in controlling her tremor and she experienced lightheadedness on higher dosing (160mg daily).  There has been discussion in the past about trying Topamax or clonazepam for the tremor if needed in the future.     When she last met with Dr. Blue in 6.2021, she reported good tremor control with improvements in her ability to eat and drink.     Danni says that the tremor is bad because her anxiety is up due to  being sick.  He has been having increased neck weakness and some gait changes and it sounds like his neurologist has settled on a diagnosis of myasthenia gravis but the work-up is still underway.  When she is anxious the tremor is worse.  She is now having problems with eating.  She gives me the example that she has to use 2 hands to get her toothbrush into her mouth but once it is there she does not need to do much because the tremor does  not work.  No problems with balance, and no changes in gait.     Danni tells me that her maternal grandmother, mother and siblings all have a similar tremor.  She mentions that the extended release propranolol is somewhat expensive.  She wonders if the dose could be increased further at this point.  She denies any lightheadedness on the medication.     She does endorse some neuropathy symptoms related to her diabetes.       Danni says that the tremor is about the same.  It varies.  She says some mornings she is really shaky other mornings not so much.  She notices it most profoundly when she is trying to eat something and has taken to bring her own spoon to restaurants because sometimes they do not have utensils to accommodate her needs.  She is not sure if the propranolol is definitively helping with the tremor or not at this point.  I am reminded that she has tried other treatments in the past.  She is not having any problems with side effects on the propranolol.  She wonders about trying the higher 80mg dose in the morning because she is more symptomatic during the day.  She also asks about getting larger strength pills so she does not have to take so many.    Strong emotions can make her tremor worse.  She does not do a lot of writing anymore.    She has not tried occupational therapy for the tremor in the past.    CURRENT MEDICATIONS:   Calcium Carb-Cholecalciferol 600-800 MG-UNIT TABS,   Cholecalciferol (VITAMIN D) 2000 UNITS CAPS, Take 1 tablet by mouth daily.   ipratropium (ATROVENT) 0.03 % nasal spray, Spray 2 sprays into both nostrils 3 times daily as needed for rhinitis  metFORMIN (GLUCOPHAGE-XR) 500 MG 24 hr tablet, Take 1,000 mg by mouth 2 times daily (with meals)  propranolol (INDERAL) 20 MG tablet, 60mg in the morning and 80mg in the evening  simvastatin (ZOCOR) 20 MG tablet, Take 1 tablet by mouth At Bedtime  propranolol (INDERAL) 20 MG tablet, Take 1 tablet (20 mg) by mouth daily The long-acting 120  "mg tablet.  propranolol ER (INDERAL LA) 120 MG 24 hr capsule, Take 1 capsule (120 mg) by mouth daily    No current facility-administered medications on file prior to visit.      RECENT DIAGNOSTIC STUDIES:   Labs: No results found for any visits on 10/31/22.    REVIEW OF SYSTEMS:                                                      10-point review of systems is negative except as mentioned above in HPI.    EXAM:                                                      Physical Exam:   Vitals: /71   Pulse 72   Wt 75.8 kg (167 lb)   BMI 29.58 kg/m    BMI= Body mass index is 29.58 kg/m .  GENERAL: NAD.  HEENT: NC/AT.  CV: RRR. S1, S2.   NECK: No bruits.  PULM: Non-labored breathing.   Neurologic:  MENTAL STATUS: Alert, attentive. Speech is fluent.  Voice tremulous.  Normal comprehension. Normal concentration. Adequate fund of knowledge.   CRANIAL NERVES: Discs technically difficult to visualize. Visual fields intact to confrontation. Pupils equally, round and reactive to light. Facial sensation and movement normal. EOM full. Hearing intact to conversation. Trapezius strength intact. Palate moves symmetrically. Tongue midline.  MOTOR: 5/5 in proximal and distal muscle groups of upper and lower extremities. Tone and bulk normal.   DTRs: Intact and symmetric in biceps, BR, patellae.  Absent ankle jerks.  Babinski down-going bilaterally.   SENSATION: Normal light touch and pinprick. Intact proprioception at great toes. Vibration: Diminished at both ankles.   COORDINATION: Normal finger nose finger. Finger tapping normal. Knee heel shin normal.  STATION AND GAIT: Sway with Romberg. Good postural reflexes. Casual gait is normal.  Good step length and armswing.  TREMOR: Action>postural tremor in the hands with moderate amplitude and frequency.  Right is worse than the left.  Intermittent \"no-no\" head tremor.  Right hand-dominant.    ASSESSMENT and PLAN:                                                      Assessment:    " ICD-10-CM    1. Essential tremor  G25.0 propranolol (INDERAL) 60 MG tablet     Occupational Therapy Referral      2. Encounter for long-term (current) use of medications  Z79.899            Ms. Rebolledo is a pleasant 82-year-old woman with long history of essential tremor.  She continues to have quite a bit of tremor despite the titrating doses of propranolol.  We did talk about potentially adding primidone versus some occupational therapy to help from an adaptive standpoint.  Patient would prefer the latter for now.  We will plan to follow-up again in 6 months.  Danni understands and agrees with plan.    Plan:  -- Continue the current daily dose of propranolol.  I am changing the order for the pharmacy to give you 60mg tablets to take twice daily.  Continue a 20mg tablet with either the evening or morning dose.  -- Referral to Occupational Therapy for adaptive tools/techniques.  -- Return to neurology clinic in 6 months.  Please let us know if any concerns arise in the meantime.    Total Time: 20 minutes were spent with the patient and in chart review/documentation (as described above in Assessment and Plan)/coordinating the care on date of service.    Beryl Prabhakar MD  Neurology    Dragon software used in the dictation of this note.                      Again, thank you for allowing me to participate in the care of your patient.        Sincerely,        Beryl Prabhakar MD

## 2022-10-31 NOTE — PROGRESS NOTES
ESTABLISHED PATIENT NEUROLOGY NOTE    DATE OF VISIT: 10/31/2022  MRN: [id number]  PATIENT NAME: Hoda Rebolledo  YOB: 1940    Chief Complaint   Patient presents with     Tremors     6 mo follow-up      SUBJECTIVE:                                                      HISTORY OF PRESENT ILLNESS:  Hoda is here for follow up regarding essential tremor.    History as previously documented by me (4.29.22):   She followed with Dr. Blue through the neurology clinic at the [facility].  Per chart review, she has history of essential tremor, felt to be familial.   Tremor reportedly has affected the hands and the head, action>postural. Primidone and gabapentin were not effective treatments for her.  She is currently on propranolol ER: 120mg daily.  Lower dosing was not effective in controlling her tremor and she experienced lightheadedness on higher dosing (160mg daily).  There has been discussion in the past about trying Topamax or clonazepam for the tremor if needed in the future.     When she last met with Dr. lBue in 6.2021, she reported good tremor control with improvements in her ability to eat and drink.     Danni says that the tremor is bad because her anxiety is up due to  being sick.  He has been having increased neck weakness and some gait changes and it sounds like his neurologist has settled on a diagnosis of myasthenia gravis but the work-up is still underway.  When she is anxious the tremor is worse.  She is now having problems with eating.  She gives me the example that she has to use 2 hands to get her toothbrush into her mouth but once it is there she does not need to do much because the tremor does not work.  No problems with balance, and no changes in gait.     Danni tells me that her maternal grandmother, mother and siblings all have a similar tremor.  She mentions that the extended release propranolol is somewhat expensive.  She wonders if the dose could be increased  further at this point.  She denies any lightheadedness on the medication.     She does endorse some neuropathy symptoms related to her diabetes.       Danni says that the tremor is about the same.  It varies.  She says some mornings she is really shaky other mornings not so much.  She notices it most profoundly when she is trying to eat something and has taken to bring her own spoon to restaurants because sometimes they do not have utensils to accommodate her needs.  She is not sure if the propranolol is definitively helping with the tremor or not at this point.  I am reminded that she has tried other treatments in the past.  She is not having any problems with side effects on the propranolol.  She wonders about trying the higher 80mg dose in the morning because she is more symptomatic during the day.  She also asks about getting larger strength pills so she does not have to take so many.    Strong emotions can make her tremor worse.  She does not do a lot of writing anymore.    She has not tried occupational therapy for the tremor in the past.    CURRENT MEDICATIONS:   Calcium Carb-Cholecalciferol 600-800 MG-UNIT TABS,   Cholecalciferol (VITAMIN D) 2000 UNITS CAPS, Take 1 tablet by mouth daily.   ipratropium (ATROVENT) 0.03 % nasal spray, Spray 2 sprays into both nostrils 3 times daily as needed for rhinitis  metFORMIN (GLUCOPHAGE-XR) 500 MG 24 hr tablet, Take 1,000 mg by mouth 2 times daily (with meals)  propranolol (INDERAL) 20 MG tablet, 60mg in the morning and 80mg in the evening  simvastatin (ZOCOR) 20 MG tablet, Take 1 tablet by mouth At Bedtime  propranolol (INDERAL) 20 MG tablet, Take 1 tablet (20 mg) by mouth daily The long-acting 120 mg tablet.  propranolol ER (INDERAL LA) 120 MG 24 hr capsule, Take 1 capsule (120 mg) by mouth daily    No current facility-administered medications on file prior to visit.      RECENT DIAGNOSTIC STUDIES:   Labs: No results found for any visits on 10/31/22.    REVIEW OF  "SYSTEMS:                                                      10-point review of systems is negative except as mentioned above in HPI.    EXAM:                                                      Physical Exam:   Vitals: /71   Pulse 72   Wt 75.8 kg (167 lb)   BMI 29.58 kg/m    BMI= Body mass index is 29.58 kg/m .  GENERAL: NAD.  HEENT: NC/AT.  CV: RRR. S1, S2.   NECK: No bruits.  PULM: Non-labored breathing.   Neurologic:  MENTAL STATUS: Alert, attentive. Speech is fluent.  Voice tremulous.  Normal comprehension. Normal concentration. Adequate fund of knowledge.   CRANIAL NERVES: Discs technically difficult to visualize. Visual fields intact to confrontation. Pupils equally, round and reactive to light. Facial sensation and movement normal. EOM full. Hearing intact to conversation. Trapezius strength intact. Palate moves symmetrically. Tongue midline.  MOTOR: 5/5 in proximal and distal muscle groups of upper and lower extremities. Tone and bulk normal.   DTRs: Intact and symmetric in biceps, BR, patellae.  Absent ankle jerks.  Babinski down-going bilaterally.   SENSATION: Normal light touch and pinprick. Intact proprioception at great toes. Vibration: Diminished at both ankles.   COORDINATION: Normal finger nose finger. Finger tapping normal. Knee heel shin normal.  STATION AND GAIT: Sway with Romberg. Good postural reflexes. Casual gait is normal.  Good step length and armswing.  TREMOR: Action>postural tremor in the hands with moderate amplitude and frequency.  Right is worse than the left.  Intermittent \"no-no\" head tremor.  Right hand-dominant.    ASSESSMENT and PLAN:                                                      Assessment:    ICD-10-CM    1. Essential tremor  G25.0 propranolol (INDERAL) 60 MG tablet     Occupational Therapy Referral      2. Encounter for long-term (current) use of medications  Z79.899            Ms. Rebolledo is a pleasant 82-year-old woman with long history of essential tremor.  " She continues to have quite a bit of tremor despite the titrating doses of propranolol.  We did talk about potentially adding primidone versus some occupational therapy to help from an adaptive standpoint.  Patient would prefer the latter for now.  We will plan to follow-up again in 6 months.  Danni understands and agrees with plan.    Plan:  -- Continue the current daily dose of propranolol.  I am changing the order for the pharmacy to give you 60mg tablets to take twice daily.  Continue a 20mg tablet with either the evening or morning dose.  -- Referral to Occupational Therapy for adaptive tools/techniques.  -- Return to neurology clinic in 6 months.  Please let us know if any concerns arise in the meantime.    Total Time: 20 minutes were spent with the patient and in chart review/documentation (as described above in Assessment and Plan)/coordinating the care on date of service.    Beryl Prabhakar MD  Neurology    Dragon software used in the dictation of this note.

## 2022-12-19 ENCOUNTER — HOSPITAL ENCOUNTER (OUTPATIENT)
Dept: OCCUPATIONAL THERAPY | Facility: REHABILITATION | Age: 82
Discharge: HOME | End: 2022-12-19
Attending: PSYCHIATRY & NEUROLOGY
Payer: MEDICARE

## 2022-12-19 DIAGNOSIS — G25.0 ESSENTIAL TREMOR: ICD-10-CM

## 2022-12-19 DIAGNOSIS — Z78.9 DECREASED ACTIVITIES OF DAILY LIVING (ADL): Primary | ICD-10-CM

## 2022-12-19 PROCEDURE — 97165 OT EVAL LOW COMPLEX 30 MIN: CPT | Mod: GO | Performed by: OCCUPATIONAL THERAPIST

## 2022-12-19 PROCEDURE — 97535 SELF CARE MNGMENT TRAINING: CPT | Mod: GO | Performed by: OCCUPATIONAL THERAPIST

## 2022-12-19 NOTE — PROGRESS NOTES
[facility]          OUTPATIENT OCCUPATIONAL THERAPY  EVALUATION  PLAN OF TREATMENT FOR OUTPATIENT REHABILITATION  (COMPLETE FOR INITIAL CLAIMS ONLY)  Patient's Last Name, First Name, M.I.  YOB: 1940  Hoda Rebolledo                        Provider's Name  [facility] Medical Record No.  [id number]                               Onset Date:     10/31/22   Start of Care Date:     12/19/22   Type:     ___PT   _X_OT   ___SLP Medical Diagnosis:     Essential tremor                          OT Diagnosis:     (P) tremors, decreased ADL and IADL function Visits from SOC:  1   _________________________________________________________________________________  Plan of Treatment/Functional Goals:  ADL training, IADL training, Self care/Home management     Goals  Goal Description: (P) Patient will demonstrate knowledge of adaptive equipment to decrease tremors during ADL and IADL's  Target Date: (P) 01/18/23       Therapy Frequency: (P) 1-2 visits     Predicted Duration of Therapy Intervention (days/wks): (P) 4 weeks  Candy Villarreal OT          I CERTIFY THE NEED FOR THESE SERVICES FURNISHED UNDER        THIS PLAN OF TREATMENT AND WHILE UNDER MY CARE     (Physician co-signature of this document indicates review and certification of the therapy plan).              Certification date from: (P) 12/19/22, Certification date to: (P) 03/19/23               Referring Physician: Dr. Beryl Prabhakar     Initial Assessment        See Epic Evaluation      Start Of Care Date: 12/19/22 12/19/22 1000   Quick Adds   Quick Adds Certification   Type of Visit Initial Outpatient Occupational Therapy Evaluation   General Information   Start Of Care Date 12/19/22   Referring Physician Dr. Beryl Prabhakar   Orders Evaluate and treat as indicated   Orders Date  10/31/22   Medical Diagnosis Essential tremor   Onset of Illness/Injury or Date of Surgery 10/31/22   Surgical/Medical History Reviewed Yes   Additional Occupational Profile Info/Pertinent History of Current Problem Patient referred to OT due to essential tremor that affects her right, dominant hand more than the left. Patient states that she has had tremors for 30 years and is wondering if there is equipment that might make it easier to eat, write or type.   Pain   Patient currently in pain No   Fall Risk Screen   Fall screen completed by OT   Have you fallen 2 or more times in the past year? No   Have you fallen and had an injury in the past year? No   Is patient a fall risk? No   Abuse Screen (yes response referral indicated)   Feels Unsafe at Home or Work/School no   Feels Threatened by Someone no   Does Anyone Try to Keep You From Having Contact with Others or Doing Things Outside Your Home? no   Physical Signs of Abuse Present no   Patient needs abuse support services and resources No   Hand Strength   Hand Dominance Right   Left Hand  (pounds) 40 pounds   Right Hand  (pounds) 45 pounds   Coordination   Coordination Comments Patient reports difficulty with fine motor tasks due to tremors   Bathing   Level of Gates - Bathing independent   Upper Body Dressing   Level of Gates: Dress Upper Body independent   Upper Body Dressing Comments fasteners are challenging due to tremor   Lower Body Dressing   Level of Gates: Dress Lower Body independent   Lower Body Dressing Comments fasteners are challenging due to tremor   Toileting   Level of Gates: Toilet independent   Grooming   Level of Gates: Grooming independent   Grooming Comments brushing teeth is challenging due to tremor   Eating/Self-Feeding   Level of Gates: Eating independent   Eating/Self Feeding Comments eating is challenging due to tremor   Instrumental Activities of Daily Living Assessment   IADL  Assessment/Observations Patient is independent with all IADL's but states that tremor makes it difficult   Planned Therapy Interventions   Planned Therapy Interventions ADL training;IADL training;Self care/Home management    OT Goal 1   Goal Description Patient will demonstrate knowledge of adaptive equipment to decrease tremors during ADL and IADL's   Target Date 01/18/23   Clinical Impression   Criteria for Skilled Therapeutic Interventions Met Yes, treatment indicated   OT Diagnosis tremors, decreased ADL and IADL function   Clinical Decision Making (Complexity) Low complexity   Therapy Frequency 1-2 visits   Predicted Duration of Therapy Intervention (days/wks) 4 weeks   Risks and Benefits of Treatment have been explained. Yes   Patient, Family & other staff in agreement with plan of care Yes   Clinical Impression Comments Patient shown adaptive equipment for tremors. See in OT for stated goals and plan of care.   Education Assessment   Barriers To Learning No Barriers   Therapy Certification   Certification date from 12/19/22   Certification date to 03/19/23   Certification I certify the need for these services furnished under this plan of treatment and while under my care.  (Physician co-signature of this document indicates review and certification of the therapy plan)   Total Evaluation Time   OT Eval, Low Complexity Minutes (31397) 15

## 2022-12-27 ENCOUNTER — TELEPHONE (OUTPATIENT)
Dept: NEUROLOGY | Facility: CLINIC | Age: 82
End: 2022-12-27

## 2022-12-27 DIAGNOSIS — G25.0 ESSENTIAL TREMOR: ICD-10-CM

## 2022-12-27 DIAGNOSIS — Z79.899 ENCOUNTER FOR LONG-TERM (CURRENT) USE OF MEDICATIONS: ICD-10-CM

## 2022-12-27 RX ORDER — PROPRANOLOL HYDROCHLORIDE 60 MG/1
60 TABLET ORAL 2 TIMES DAILY
Qty: 180 TABLET | Refills: 1 | Status: SHIPPED | OUTPATIENT
Start: 2022-12-27

## 2022-12-27 RX ORDER — PROPRANOLOL HYDROCHLORIDE 20 MG/1
TABLET ORAL
Qty: 90 TABLET | Refills: 1 | Status: SHIPPED | OUTPATIENT
Start: 2022-12-27

## 2022-12-27 NOTE — TELEPHONE ENCOUNTER
[facility] Call Center    Phone Message    May a detailed message be left on voicemail: yes     Reason for Call: Medication Refill Request    Has the patient contacted the pharmacy for the refill? no  Name of medication being requested: propranolol (INDERAL) 60 MG tablet  Provider who prescribed the medication:   Pharmacy: [id number], [place] - [address]  Date medication is needed:     Patient called wondering if she can have this medication refilled for 90 days, this is a lot cheaper for patient and she will need it as patient will be traveling the first of the year. Please fax new prescription to [facility] #[id number] - [facility], [place] - [address]      Action Taken: [facility]    Travel Screening: Not Applicable

## 2022-12-27 NOTE — TELEPHONE ENCOUNTER
Signed Prescriptions:                        Disp   Refills    propranolol (INDERAL) 60 MG tablet         180 ta*1        Sig: Take 1 tablet (60 mg) by mouth 2 times daily Add 20mg           tablet to the morning dose (80mg)  Authorizing Provider: TAYA GRANT  Ordering User: FELIPE MAURICIO    propranolol (INDERAL) 20 MG tablet         90 tab*1        Sig: Take 1 tablet in the AM with 60mg tablet (total dose           80mg).  Authorizing Provider: TAYA GRANT  Ordering User: FELIPE MAURICIO    RN reviewed chart notes. Provided 90 day refill for propranolol 60mg tablet and propranolol 20mg tablet. Pt to take 80mg in AM and 60mg HS.     Felipe Mauricio RN, BSN  [facility] Neurology

## 2023-02-09 NOTE — ADDENDUM NOTE
Encounter addended by: Candy Villarreal, OT on: 2/8/2023 8:37 PM   Actions taken: Episode resolved, Clinical Note Signed, Flowsheet accepted

## 2023-02-09 NOTE — PROGRESS NOTES
[facility]    Outpatient Occupational Therapy Discharge Note  Patient: Hoda Rebolledo  : 1940    Beginning/End Dates of Reporting Period:  22    Referring Provider: Dr. Beryl Prabhakar    Therapy Diagnosis: tremors, decreased ADL and IADL function    Goals:   Goal Identifier     Goal Description Patient will demonstrate knowledge of adaptive equipment to decrease tremors during ADL and IADL's   Target Date 23   Date Met  22   Progress (detail required for progress note):       Plan:  Discharge from therapy.

## 2023-06-01 ENCOUNTER — HEALTH MAINTENANCE LETTER (OUTPATIENT)
Age: 83
End: 2023-06-01

## 2024-06-15 ENCOUNTER — HEALTH MAINTENANCE LETTER (OUTPATIENT)
Age: 84
End: 2024-06-15

## 2025-07-06 ENCOUNTER — HEALTH MAINTENANCE LETTER (OUTPATIENT)
Age: 85
End: 2025-07-06

## (undated) DEVICE — TUBING SUCTION MEDI-VAC 1/4"X20' N620A - HE

## (undated) DEVICE — SNARE OVAL SMALL DISP 100600

## (undated) DEVICE — SUCTION MANIFOLD NEPTUNE 2 SYS 1 PORT 702-025-000

## (undated) DEVICE — SOL WATER IRRIG 1000ML BOTTLE 2F7114

## (undated) RX ORDER — PROPOFOL 10 MG/ML
INJECTION, EMULSION INTRAVENOUS
Status: DISPENSED
Start: 2022-09-19

## (undated) RX ORDER — FENTANYL CITRATE-0.9 % NACL/PF 10 MCG/ML
PLASTIC BAG, INJECTION (ML) INTRAVENOUS
Status: DISPENSED
Start: 2022-09-19

## (undated) RX ORDER — DEXAMETHASONE SODIUM PHOSPHATE 4 MG/ML
INJECTION, SOLUTION INTRA-ARTICULAR; INTRALESIONAL; INTRAMUSCULAR; INTRAVENOUS; SOFT TISSUE
Status: DISPENSED
Start: 2022-09-19

## (undated) RX ORDER — ONDANSETRON 2 MG/ML
INJECTION INTRAMUSCULAR; INTRAVENOUS
Status: DISPENSED
Start: 2022-09-19